# Patient Record
Sex: MALE | Race: WHITE | NOT HISPANIC OR LATINO | Employment: OTHER | URBAN - METROPOLITAN AREA
[De-identification: names, ages, dates, MRNs, and addresses within clinical notes are randomized per-mention and may not be internally consistent; named-entity substitution may affect disease eponyms.]

---

## 2017-01-04 PROBLEM — K22.2 SCHATZKI'S RING: Status: ACTIVE | Noted: 2017-01-04

## 2017-01-04 PROBLEM — G43.909 MIGRAINE HEADACHE: Status: ACTIVE | Noted: 2017-01-04

## 2017-01-04 PROBLEM — E78.5 DYSLIPIDEMIA: Status: ACTIVE | Noted: 2017-01-04

## 2017-01-04 PROBLEM — I10 HYPERTENSION: Status: ACTIVE | Noted: 2017-01-04

## 2017-01-04 PROBLEM — K21.9 GASTROESOPHAGEAL REFLUX DISEASE: Status: ACTIVE | Noted: 2017-01-04

## 2017-01-04 PROBLEM — K22.10 EROSIVE ESOPHAGITIS: Status: ACTIVE | Noted: 2017-01-04

## 2017-01-04 PROBLEM — I25.10 CORONARY ARTERY DISEASE: Status: ACTIVE | Noted: 2017-01-04

## 2017-02-22 ENCOUNTER — OFFICE VISIT (OUTPATIENT)
Dept: CARDIOLOGY | Facility: CLINIC | Age: 62
End: 2017-02-22

## 2017-02-22 VITALS
BODY MASS INDEX: 27.06 KG/M2 | DIASTOLIC BLOOD PRESSURE: 99 MMHG | HEIGHT: 75 IN | SYSTOLIC BLOOD PRESSURE: 152 MMHG | WEIGHT: 217.6 LBS | HEART RATE: 64 BPM

## 2017-02-22 DIAGNOSIS — E78.5 DYSLIPIDEMIA: ICD-10-CM

## 2017-02-22 DIAGNOSIS — I10 ESSENTIAL HYPERTENSION: ICD-10-CM

## 2017-02-22 DIAGNOSIS — I25.10 CORONARY ARTERY DISEASE INVOLVING NATIVE CORONARY ARTERY OF NATIVE HEART WITHOUT ANGINA PECTORIS: Primary | ICD-10-CM

## 2017-02-22 PROCEDURE — 99211 OFF/OP EST MAY X REQ PHY/QHP: CPT | Performed by: INTERNAL MEDICINE

## 2017-02-22 RX ORDER — LANSOPRAZOLE 30 MG/1
30 CAPSULE, DELAYED RELEASE ORAL DAILY
COMMUNITY
End: 2017-02-22 | Stop reason: SDUPTHER

## 2017-02-22 RX ORDER — ASPIRIN 325 MG
325 TABLET ORAL DAILY
COMMUNITY

## 2017-02-22 RX ORDER — NITROGLYCERIN 0.4 MG/1
0.4 TABLET SUBLINGUAL
COMMUNITY
End: 2017-02-22 | Stop reason: SDUPTHER

## 2017-02-22 RX ORDER — LANSOPRAZOLE 30 MG/1
30 CAPSULE, DELAYED RELEASE ORAL DAILY
Qty: 90 CAPSULE | Refills: 3 | Status: SHIPPED | OUTPATIENT
Start: 2017-02-22 | End: 2017-06-01 | Stop reason: SDUPTHER

## 2017-02-22 RX ORDER — RAMIPRIL 5 MG/1
5 CAPSULE ORAL DAILY
Qty: 90 CAPSULE | Refills: 3 | Status: SHIPPED | OUTPATIENT
Start: 2017-02-22 | End: 2017-03-06 | Stop reason: SDUPTHER

## 2017-02-22 RX ORDER — PROPRANOLOL HYDROCHLORIDE 160 MG/1
160 CAPSULE, EXTENDED RELEASE ORAL
Qty: 90 CAPSULE | Refills: 3 | Status: SHIPPED | OUTPATIENT
Start: 2017-02-22 | End: 2017-03-06 | Stop reason: SDUPTHER

## 2017-02-22 RX ORDER — NITROGLYCERIN 0.4 MG/1
0.4 TABLET SUBLINGUAL
Qty: 25 TABLET | Refills: 11 | Status: SHIPPED | OUTPATIENT
Start: 2017-02-22 | End: 2017-03-06 | Stop reason: SDUPTHER

## 2017-02-22 NOTE — PROGRESS NOTES
Jeremias LIZAMA Kirby  1955  [phone]      02/22/2017    Rudy Blair MD    Chief Complaint   Patient presents with   • Coronary Artery Disease     PROBLEM LIST:  1.  Coronary artery disease:  a. Inferior myocardial infarction,  status post TPA therapy on 01/21/2001 -- data deficit.   b. Multivessel disease by cardiac catheterization in June 2006.   c. Coronary artery bypass grafting x5 by Dr. Eliu Madrid, 01/15/2006:  LIMA to the LAD and diagonal, CATERINA from the LIMA  as a pedicle graft sequentially to OM1 and the PLB, SVG to the PDA.   d. Cardiac GXT revealed anterior myocardial perfusion defect, 05/01/2006, with a normal ejection fraction.   e. PTCA and stent placement to the circumflex using a 2.5 x 13 mm Cypher  drug eluting stent, 05/12/2006.   2. Previous tobacco abuse.   3.  Hypertension.   4. Dyslipidemia.   5. Migraine headaches.   6. Erosive/ulcerative esophagitis.   7. Schatzki's ring.  8. Residual gastroesophageal reflux disease with mild dysphagia.   9. Nonspecific generalized joint pain.   10. Surgical history:    a. Right knee surgery.   b. Left wrist reconstruction.   c. Inguinal hernia repair.   d. Incisional hernia  repair.   e. Esophageal dilatation.   f. Coronary artery bypass grafting.   g. Cholecystectomy, September 2008.      Allergies   Allergen Reactions   • Crestor [Rosuvastatin Calcium]      leg cramps and weakness after a year.    • Lipitor [Atorvastatin]    • Livalo [Pitavastatin]       leg cramps and weakness.    • Vytorin [Ezetimibe-Simvastatin]        Current Medications:      Current Outpatient Prescriptions:   •  Alirocumab (PRALUENT) 75 MG/ML solution pen-injector, Inject 75 mg under the skin every 14 (fourteen) days., Disp: 6 pen, Rfl: 3  •  aspirin 325 MG tablet, Take 325 mg by mouth Daily., Disp: , Rfl:   •  lansoprazole (PREVACID) 30 MG capsule, Take 30 mg by mouth Daily., Disp: , Rfl:   •  nitroglycerin (NITROSTAT) 0.4 MG SL tablet, Place 0.4 mg under the tongue  "Every 5 (Five) Minutes As Needed for chest pain. Take no more than 3 doses in 15 minutes., Disp: , Rfl:   •  propranolol LA (INDERAL LA) 160 MG 24 hr capsule, Take 1 capsule by mouth daily., Disp: 90 capsule, Rfl: 3  •  ramipril (ALTACE) 5 MG capsule, Take 1 capsule by mouth daily., Disp: 90 capsule, Rfl: 3    HPI    Mr. Orr presents today for follow up for coronary artery disease. Since last visit, patient has been feeling well from a cardiac standpoint. He notes he did have some chest pain this morning, but blames it on his ulcer disease and stress. He monitors his blood pressure at home and notes it typically runs in the normal range. Patient denies chest pain, palpitations, shortness of breath, edema, PND, orthopnea, dizziness, and syncope. 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    The following portions of the patient's history were reviewed and updated as appropriate: allergies, current medications and problem list.    Pertinent positives as listed in the HPI.  All other systems reviewed are negative.    Vitals:    02/22/17 1024   BP: 152/99   BP Location: Right arm   Patient Position: Sitting   Pulse: 64   Weight: 217 lb 9.6 oz (98.7 kg)   Height: 75\" (190.5 cm)       General: Alert and oriented  Neck: Jugular venous pressure is within normal limits. Carotids have normal upstrokes without bruits.   Cardiovascular: Heart has a nondisplaced focal PMI. Regular rate and rhythm without murmur, gallop or rub.  Lungs: Clear without rales or wheezes. Equal expansion is noted.   Extremities: Show no edema.  Skin: warm and dry.  Neurologic: nonfocal      Diagnostic Data:      Procedures    Assessment:      ICD-10-CM ICD-9-CM   1. Coronary artery disease involving native coronary artery of native heart without angina pectoris I25.10 414.01   2. Essential hypertension I10 401.9   3. Dyslipidemia E78.5 272.4       Plan:    1. Get lipid panel within the next 3 months.  2. Continue current " medications.  3. F/up in 12 months or sooner if needed.    Scribed for Magdalene Scott MD by Jay Morton. 2/22/2017  10:36 AM    I, Magdalene Scott MD, personally performed the services described in this documentation as scribed by the above named individual in my presence, and it is both accurate and complete.  2/22/2017  10:38 AM

## 2017-03-06 RX ORDER — RAMIPRIL 5 MG/1
5 CAPSULE ORAL DAILY
Qty: 90 CAPSULE | Refills: 3 | Status: SHIPPED | OUTPATIENT
Start: 2017-03-06 | End: 2018-02-28 | Stop reason: SDUPTHER

## 2017-03-06 RX ORDER — NITROGLYCERIN 0.4 MG/1
0.4 TABLET SUBLINGUAL
Qty: 25 TABLET | Refills: 11 | Status: SHIPPED | OUTPATIENT
Start: 2017-03-06 | End: 2019-10-21 | Stop reason: SDUPTHER

## 2017-03-06 RX ORDER — PROPRANOLOL HYDROCHLORIDE 160 MG/1
160 CAPSULE, EXTENDED RELEASE ORAL
Qty: 90 CAPSULE | Refills: 3 | Status: SHIPPED | OUTPATIENT
Start: 2017-03-06 | End: 2018-02-28 | Stop reason: SDUPTHER

## 2017-04-13 DIAGNOSIS — E78.00 HYPERCHOLESTEREMIA: Primary | ICD-10-CM

## 2017-06-01 RX ORDER — LANSOPRAZOLE 30 MG/1
30 CAPSULE, DELAYED RELEASE ORAL DAILY
Qty: 90 CAPSULE | Refills: 0 | Status: SHIPPED | OUTPATIENT
Start: 2017-06-01 | End: 2017-09-01 | Stop reason: SDUPTHER

## 2017-06-15 DIAGNOSIS — E78.00 HYPERCHOLESTEREMIA: ICD-10-CM

## 2017-07-06 DIAGNOSIS — E78.00 HYPERCHOLESTEREMIA: ICD-10-CM

## 2017-09-05 RX ORDER — LANSOPRAZOLE 30 MG/1
CAPSULE, DELAYED RELEASE ORAL
Qty: 90 CAPSULE | Refills: 2 | Status: SHIPPED | OUTPATIENT
Start: 2017-09-05 | End: 2018-02-28 | Stop reason: SDUPTHER

## 2018-01-19 DIAGNOSIS — E78.5 DYSLIPIDEMIA: Primary | ICD-10-CM

## 2018-02-05 ENCOUNTER — HOSPITAL ENCOUNTER (OUTPATIENT)
Age: 63
End: 2018-02-05
Payer: COMMERCIAL

## 2018-02-05 DIAGNOSIS — E78.5: Primary | ICD-10-CM

## 2018-02-05 LAB
ALBUMIN LEVEL: 3.8 GM/DL (ref 3.4–5)
ALP ISO SERPL-ACNC: 128 U/L (ref 46–116)
ALT SERPLBLD-CCNC: 38 U/L (ref 12–78)
AST SERPL QL: 25 U/L (ref 15–37)
BILIRUB DIRECT SERPL-MCNC: 0.1 MG/DL (ref 0–0.2)
BILIRUBIN,TOTAL: 0.5 MG/DL (ref 0.2–1)
CHOLEST SPEC-SCNC: 163 MG/DL (ref 140–200)
HDLC SERPL-MCNC: 46 MG/DL (ref 27–67)
PROT SERPL-MCNC: 6.7 GM/DL (ref 6.4–8.2)
TRIGLYCERIDES: 150 MG/DL (ref 30–200)

## 2018-02-05 PROCEDURE — 80061 LIPID PANEL: CPT

## 2018-02-05 PROCEDURE — 80076 HEPATIC FUNCTION PANEL: CPT

## 2018-02-05 PROCEDURE — 36415 COLL VENOUS BLD VENIPUNCTURE: CPT

## 2018-02-28 ENCOUNTER — OFFICE VISIT (OUTPATIENT)
Dept: CARDIOLOGY | Facility: CLINIC | Age: 63
End: 2018-02-28

## 2018-02-28 VITALS
BODY MASS INDEX: 28.77 KG/M2 | WEIGHT: 231.4 LBS | HEIGHT: 75 IN | OXYGEN SATURATION: 96 % | SYSTOLIC BLOOD PRESSURE: 138 MMHG | DIASTOLIC BLOOD PRESSURE: 86 MMHG | HEART RATE: 63 BPM

## 2018-02-28 DIAGNOSIS — I10 ESSENTIAL HYPERTENSION: ICD-10-CM

## 2018-02-28 DIAGNOSIS — E78.5 HYPERLIPIDEMIA LDL GOAL <70: ICD-10-CM

## 2018-02-28 DIAGNOSIS — I25.810 CORONARY ARTERY DISEASE INVOLVING CORONARY BYPASS GRAFT OF NATIVE HEART WITHOUT ANGINA PECTORIS: Primary | ICD-10-CM

## 2018-02-28 PROCEDURE — 99213 OFFICE O/P EST LOW 20 MIN: CPT | Performed by: INTERNAL MEDICINE

## 2018-02-28 RX ORDER — CETIRIZINE HYDROCHLORIDE 10 MG/1
10 TABLET ORAL DAILY
COMMUNITY

## 2018-02-28 RX ORDER — RAMIPRIL 5 MG/1
5 CAPSULE ORAL DAILY
Qty: 90 CAPSULE | Refills: 3 | Status: SHIPPED | OUTPATIENT
Start: 2018-02-28 | End: 2019-03-11 | Stop reason: SDUPTHER

## 2018-02-28 RX ORDER — PROPRANOLOL HYDROCHLORIDE 160 MG/1
160 CAPSULE, EXTENDED RELEASE ORAL
Qty: 90 CAPSULE | Refills: 3 | Status: SHIPPED | OUTPATIENT
Start: 2018-02-28 | End: 2019-03-11 | Stop reason: SDUPTHER

## 2018-02-28 RX ORDER — LANSOPRAZOLE 30 MG/1
30 CAPSULE, DELAYED RELEASE ORAL DAILY
Qty: 90 CAPSULE | Refills: 3 | Status: SHIPPED | OUTPATIENT
Start: 2018-02-28 | End: 2019-02-25 | Stop reason: SDUPTHER

## 2018-02-28 RX ORDER — NAPROXEN SODIUM 220 MG
220 TABLET ORAL DAILY
COMMUNITY

## 2018-02-28 NOTE — PROGRESS NOTES
Jeremias LIZAMA Kirby  1955  [phone]      02/28/2018    Ted Jackson MD    Chief Complaint   Patient presents with   • Coronary Artery Disease   • Hypertension   • Hyperlipidemia       Problem List:  1.  Coronary artery disease:  a. Inferior myocardial infarction,  status post TPA therapy on 01/21/2001 -- data deficit.   b. Multivessel disease by cardiac catheterization in June 2006.   c. Coronary artery bypass grafting x5 by Dr. Eliu Madrid, 01/15/2006:  LIMA to the LAD and diagonal, CATERINA from the LIMA  as a pedicle graft sequentially to OM1 and the PLB, SVG to the PDA.   d. Cardiac GXT revealed anterior myocardial perfusion defect, 05/01/2006, with a normal ejection fraction.   e. PTCA and stent placement to the circumflex using a 2.5 x 13 mm Cypher  drug eluting stent, 05/12/2006.   2. Previous tobacco abuse.   3. Hypertension.   4. Dyslipidemia.   5. Migraine headaches.   6. Erosive/ulcerative esophagitis.   7. Schatzki's ring.  8. Residual gastroesophageal reflux disease with mild dysphagia.   9. Nonspecific generalized joint pain.   10. Surgical history:    a. Right knee surgery.   b. Left wrist reconstruction.   c. Inguinal hernia repair.   d. Incisional hernia  repair.   e. Esophageal dilatation.   f. Coronary artery bypass grafting.   g. Cholecystectomy, September 2008.        Allergies   Allergen Reactions   • Crestor [Rosuvastatin Calcium]      leg cramps and weakness after a year.    • Lipitor [Atorvastatin]    • Livalo [Pitavastatin]       leg cramps and weakness.    • Vytorin [Ezetimibe-Simvastatin]        Current Medications:      Current Outpatient Prescriptions:   •  Alirocumab (PRALUENT) 75 MG/ML solution pen-injector, Inject 75 mg under the skin Every 14 (Fourteen) Days., Disp: 2 pen, Rfl: 11  •  aspirin 325 MG tablet, Take 325 mg by mouth Daily., Disp: , Rfl:   •  cetirizine (zyrTEC) 10 MG tablet, Take 10 mg by mouth Daily., Disp: , Rfl:   •  FIBER PO, Take  by mouth Daily., Disp: ,  "Rfl:   •  lansoprazole (PREVACID) 30 MG capsule, TAKE 1 CAPSULE BY MOUTH  EVERY DAY FOR STOMACH Rfsrem none, Disp: 90 capsule, Rfl: 2  •  naproxen sodium (ALEVE) 220 MG tablet, Take 220 mg by mouth Every Other Day., Disp: , Rfl:   •  nitroglycerin (NITROSTAT) 0.4 MG SL tablet, Place 1 tablet under the tongue Every 5 (Five) Minutes As Needed for chest pain. Take no more than 3 doses in 15 minutes., Disp: 25 tablet, Rfl: 11  •  propranolol LA (INDERAL LA) 160 MG 24 hr capsule, Take 1 capsule by mouth Daily., Disp: 90 capsule, Rfl: 3  •  ramipril (ALTACE) 5 MG capsule, Take 1 capsule by mouth Daily., Disp: 90 capsule, Rfl: 3    HPI    Jeremias Orr presents today for 12 month follow up of coronary artery disease, hypertension, and hyperlipidemia. Since last visit, patient has been feeling well overall from a cardiovascular standpoint. He admittedly does not monitor his blood pressure at home. He has been experiencing migraines again as of late. He was given tramadol for this, but has not taken it. Patient denies chest pain, palpitations, shortness of breath, edema, PND, orthopnea, dizziness, and syncope. 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 He saw Dr. Ibanez with orthopedics about his knee.    The following portions of the patient's history were reviewed and updated as appropriate: allergies, current medications and problem list.    Pertinent positives as listed in the HPI.  All other systems reviewed are negative.    Vitals:    02/28/18 1044   BP: 138/86   BP Location: Left arm   Patient Position: Sitting   Pulse: 63   SpO2: 96%   Weight: 105 kg (231 lb 6.4 oz)   Height: 190.5 cm (75\")       Physical Exam:  General: Alert and oriented to person, place, and time.  Neck: Jugular venous pressure is within normal limits. Carotids have normal upstrokes without bruits.   Cardiovascular: Regular rate and rhythm without murmur gallop or rub.  Lungs: Clear without rales or " wheezes. Equal expansion is noted.   Extremities: Show trace edema.  Skin: warm and dry.  Neurologic: nonfocal    Diagnostic Data:    Lipid panel (2/5/2018):  , Trig 150, HDL 46, LDL 87    Procedures    Assessment:      ICD-10-CM ICD-9-CM   1. Coronary artery disease involving coronary bypass graft of native heart without angina pectoris I25.810 414.05   2. Essential hypertension I10 401.9   3. Hyperlipidemia LDL goal <70 E78.5 272.4       Plan:    1. Continue current medications.  2. F/up in 12 months or sooner if needed.    Scribed for Magdalene Scott MD by Jay Morton. 2/28/2018  11:05 AM    I Magdalene Scott MD personally performed the services described in this documentation as scribed by the above individual in my presence, and it is both accurate and complete.    Magdalene Scott MD, FACC

## 2018-05-23 ENCOUNTER — HOSPITAL ENCOUNTER (OUTPATIENT)
Age: 63
End: 2018-05-23
Payer: COMMERCIAL

## 2018-05-23 DIAGNOSIS — M25.561: Primary | ICD-10-CM

## 2018-05-23 PROCEDURE — 73564 X-RAY EXAM KNEE 4 OR MORE: CPT

## 2018-11-19 ENCOUNTER — DOCUMENTATION (OUTPATIENT)
Dept: CARDIOLOGY | Facility: CLINIC | Age: 63
End: 2018-11-19

## 2018-11-19 NOTE — PROGRESS NOTES
Insurance will not be covering Praluent after 1/1/19- we will switch to Repatha 140 mg at that time- PT's wife will call me after 1/1/19 and we will send a script to [facility]

## 2019-02-25 RX ORDER — LANSOPRAZOLE 30 MG/1
30 CAPSULE, DELAYED RELEASE ORAL DAILY
Qty: 90 CAPSULE | Refills: 3 | Status: SHIPPED | OUTPATIENT
Start: 2019-02-25 | End: 2020-03-02

## 2019-02-28 DIAGNOSIS — E78.5 HYPERLIPIDEMIA, UNSPECIFIED HYPERLIPIDEMIA TYPE: Primary | ICD-10-CM

## 2019-03-04 ENCOUNTER — HOSPITAL ENCOUNTER (OUTPATIENT)
Age: 64
End: 2019-03-04
Payer: COMMERCIAL

## 2019-03-04 DIAGNOSIS — E78.5: Primary | ICD-10-CM

## 2019-03-04 LAB
ALBUMIN LEVEL: 3.6 GM/DL (ref 3.4–5)
ALBUMIN/GLOB SERPL: 1.2 {RATIO} (ref 1.1–1.8)
ALP ISO SERPL-ACNC: 115 U/L (ref 46–116)
ALT SERPLBLD-CCNC: 37 U/L (ref 12–78)
ANION GAP SERPL CALC-SCNC: 11.3 MEQ/L (ref 5–15)
AST SERPL QL: 20 U/L (ref 15–37)
BILIRUBIN,TOTAL: 0.5 MG/DL (ref 0.2–1)
BUN SERPL-MCNC: 18 MG/DL (ref 7–18)
CALCIUM SPEC-MCNC: 8.9 MG/DL (ref 8.5–10.1)
CHLORIDE SPEC-SCNC: 106 MMOL/L (ref 98–107)
CHOLEST SPEC-SCNC: 173 MG/DL (ref 140–200)
CO2 SERPL-SCNC: 29 MMOL/L (ref 21–32)
CREAT BLD-SCNC: 1.2 MG/DL (ref 0.7–1.3)
ESTIMATED GLOMERULAR FILT RATE: 61 ML/MIN (ref 60–?)
GFR (AFRICAN AMERICAN): 74 ML/MIN (ref 60–?)
GLOBULIN SER CALC-MCNC: 2.9 GM/DL (ref 1.3–3.2)
GLUCOSE: 96 MG/DL (ref 74–106)
HDLC SERPL-MCNC: 40 MG/DL (ref 27–67)
POTASSIUM: 5.3 MMOL/L (ref 3.5–5.1)
PROT SERPL-MCNC: 6.5 GM/DL (ref 6.4–8.2)
SODIUM SPEC-SCNC: 141 MMOL/L (ref 136–145)
TRIGLYCERIDES: 145 MG/DL (ref 30–200)

## 2019-03-04 PROCEDURE — 80053 COMPREHEN METABOLIC PANEL: CPT

## 2019-03-04 PROCEDURE — 80061 LIPID PANEL: CPT

## 2019-03-04 PROCEDURE — 36415 COLL VENOUS BLD VENIPUNCTURE: CPT

## 2019-03-06 ENCOUNTER — OFFICE VISIT (OUTPATIENT)
Dept: CARDIOLOGY | Facility: CLINIC | Age: 64
End: 2019-03-06

## 2019-03-06 VITALS
SYSTOLIC BLOOD PRESSURE: 142 MMHG | DIASTOLIC BLOOD PRESSURE: 82 MMHG | WEIGHT: 234 LBS | HEIGHT: 75 IN | HEART RATE: 64 BPM | BODY MASS INDEX: 29.09 KG/M2

## 2019-03-06 DIAGNOSIS — I10 ESSENTIAL HYPERTENSION: ICD-10-CM

## 2019-03-06 DIAGNOSIS — I25.810 CORONARY ARTERY DISEASE INVOLVING CORONARY BYPASS GRAFT OF NATIVE HEART WITHOUT ANGINA PECTORIS: Primary | ICD-10-CM

## 2019-03-06 DIAGNOSIS — E78.5 HYPERLIPIDEMIA LDL GOAL <70: ICD-10-CM

## 2019-03-06 PROCEDURE — 99213 OFFICE O/P EST LOW 20 MIN: CPT | Performed by: NURSE PRACTITIONER

## 2019-03-06 NOTE — PROGRESS NOTES
Jeremias ALDA Kirby  1955  63 y.o.  [phone]  Mobile phone not available      03/06/2019    PCP: Ted Jackson MD    Chief Complaint   Patient presents with   • Coronary Artery Disease       Problem List:  1.  Coronary artery disease:  a. Inferior MI,  status post TPA therapy on 01/21/2001 -- data deficit.   b. Multivessel disease by cardiac catheterization in June 2006.   c. CABG x5 by Dr. Eliu Madrid, 01/15/2006:  LIMA to the LAD and diagonal, CATERINA from the LIMA  as a pedicle graft sequentially to OM1 and the PLB, SVG to the PDA.   d. Cardiac GXT revealed anterior myocardial perfusion defect, 05/01/2006, with a normal ejection fraction.   e. PTCA and stent placement to the circumflex using a 2.5 x 13 mm Cypher  drug eluting stent, 05/12/2006.   2. Previous tobacco abuse.   3. Hypertension.   4. Dyslipidemia.   5. Migraine headaches.   6. Erosive/ulcerative esophagitis.   7. Schatzki's ring.  8. Residual gastroesophageal reflux disease with mild dysphagia.   9. Nonspecific generalized joint pain.   10. Surgical history:    a. Right knee surgery.   b. Left wrist reconstruction.   c. Inguinal hernia repair.   d. Incisional hernia  repair.   e. Esophageal dilatation.   f. Coronary artery bypass grafting.   g. Cholecystectomy, September 2008.      Allergies   Allergen Reactions   • Crestor [Rosuvastatin Calcium]      leg cramps and weakness after a year.    • Lipitor [Atorvastatin]    • Livalo [Pitavastatin]       leg cramps and weakness.    • Vytorin [Ezetimibe-Simvastatin]        Current Medications:      Current Outpatient Medications:   •  aspirin 325 MG tablet, Take 325 mg by mouth Daily., Disp: , Rfl:   •  cetirizine (zyrTEC) 10 MG tablet, Take 10 mg by mouth Daily., Disp: , Rfl:   •  FIBER PO, Take  by mouth Daily., Disp: , Rfl:   •  lansoprazole (PREVACID) 30 MG capsule, TAKE 1 CAPSULE BY MOUTH DAILY, Disp: 90 capsule, Rfl: 3  •  naproxen sodium (ALEVE) 220 MG tablet, Take 220 mg by mouth Every Other  "Day., Disp: , Rfl:   •  nitroglycerin (NITROSTAT) 0.4 MG SL tablet, Place 1 tablet under the tongue Every 5 (Five) Minutes As Needed for chest pain. Take no more than 3 doses in 15 minutes., Disp: 25 tablet, Rfl: 11  •  PRALUENT 75 MG/ML solution pen-injector, INJECT 75 MG SUBCUTANEOUSLY EVERY 14 DAYS., Disp: 6 pen, Rfl: 1  •  propranolol LA (INDERAL LA) 160 MG 24 hr capsule, Take 1 capsule by mouth Daily., Disp: 90 capsule, Rfl: 3  •  ramipril (ALTACE) 5 MG capsule, Take 1 capsule by mouth Daily., Disp: 90 capsule, Rfl: 3    HPI    Jeremias Orr is a 63 y.o. male who presents today for annual  follow up of coronary artery disease, hypertension and hyperlipidemia. Since last visit, patient has been doing well from a cardiac standpoint.  He states that based on weather throughout the winter he has not been able to be as physically active as he usually is.  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  He does admit to looking forward to turning 64 this year.  His Praluent is currently being authorized.  His most recent LDL from March 2019 was 104 on Praluent.  He states that his blood pressures tend to run in the upper 130s consistently.  He will continue to monitor from home and let us know if they start running higher trending upward.  He has no complaints of chest pain, shortness of breath, dyspnea on exertion, edema, fatigue, palpitations, dizziness and syncope.  Overall, he seems to be doing very well from a cardiac standpoint.     The following portions of the patient's history were reviewed and updated as appropriate: allergies, current medications and problem list.    Pertinent positives as listed in the HPI.  All other systems reviewed are negative.    Vitals:    03/06/19 1033   BP: 142/82   BP Location: Right arm   Patient Position: Sitting   Pulse: 64   Weight: 106 kg (234 lb)   Height: 190.5 cm (75\")       Physical Exam:    GENERAL: well-developed, well-nourished; in no acute " distress.   NECK:  Carotid upstrokes are 2+ and  symmetrical without bruits.   LUNGS: Clear to auscultation bilaterally without wheezing, rhonchi, or rales noted.   CARDIOVASCULAR: The heart has a regular rate with a normal S1 and S2. There is no murmur, gallop, rub, or click appreciated. The PMI is nondisplaced.   NEUROLOGICAL: Nonfocal; Alert and oriented  PERIPHERAL VASCULAR:  Posterior tibial pulses are 2+ and symmetrical. There is no peripheral edema.   SKIN:  Warm and dry  PSYCHIATRIC: normal mood and affect; behavior appropriate    Diagnostic Data:  Lipids 3/4/19  Total cholesterol 173  Triglycerides 145  HDL 40      Procedures    Assessment:      ICD-10-CM ICD-9-CM   1. Coronary artery disease involving coronary bypass graft of native heart without angina pectoris I25.810 414.05   2. Essential hypertension I10 401.9   3. Hyperlipidemia LDL goal <70 E78.5 272.4       Plan:    1. Encouraged routine exercise and dietary modifications  2. Monitor blood pressures at home.  If they should start remaining greater than 140 systolic, he will notify our office.  3. Continue all  current medications.  4. F/up in 12 months or sooner if needed.    Seen independently by CARO Garcia on . 3/6/2019  11:05 AM

## 2019-03-11 RX ORDER — RAMIPRIL 5 MG/1
5 CAPSULE ORAL DAILY
Qty: 90 CAPSULE | Refills: 3 | Status: SHIPPED | OUTPATIENT
Start: 2019-03-11 | End: 2020-03-16

## 2019-03-11 RX ORDER — PROPRANOLOL HYDROCHLORIDE 160 MG/1
160 CAPSULE, EXTENDED RELEASE ORAL
Qty: 90 CAPSULE | Refills: 3 | Status: SHIPPED | OUTPATIENT
Start: 2019-03-11 | End: 2020-03-16

## 2019-10-21 RX ORDER — NITROGLYCERIN 0.4 MG/1
0.4 TABLET SUBLINGUAL
Qty: 25 TABLET | Refills: 11 | Status: SHIPPED | OUTPATIENT
Start: 2019-10-21

## 2020-03-02 RX ORDER — LANSOPRAZOLE 30 MG/1
30 CAPSULE, DELAYED RELEASE ORAL DAILY
Qty: 90 CAPSULE | Refills: 3 | Status: SHIPPED | OUTPATIENT
Start: 2020-03-02 | End: 2020-04-30 | Stop reason: SDUPTHER

## 2020-03-09 ENCOUNTER — TELEPHONE (OUTPATIENT)
Dept: CARDIOLOGY | Facility: CLINIC | Age: 65
End: 2020-03-09

## 2020-03-09 DIAGNOSIS — E78.5 HYPERLIPIDEMIA LDL GOAL <70: Primary | ICD-10-CM

## 2020-03-09 NOTE — TELEPHONE ENCOUNTER
"Pt called and stated that he needed lab orders sent to [facility] in [place]. Pt states he has already had the labs drawn, but needs to orders. Pt states he needs orders for \"the labs he usually gets\".     Orders for CMP and FLP faxed to [facility] at [phone].  "

## 2020-03-16 RX ORDER — RAMIPRIL 5 MG/1
5 CAPSULE ORAL DAILY
Qty: 90 CAPSULE | Refills: 3 | Status: SHIPPED | OUTPATIENT
Start: 2020-03-16 | End: 2020-04-30 | Stop reason: SDUPTHER

## 2020-03-16 RX ORDER — PROPRANOLOL HYDROCHLORIDE 160 MG/1
160 CAPSULE, EXTENDED RELEASE ORAL
Qty: 90 EACH | Refills: 2 | Status: SHIPPED | OUTPATIENT
Start: 2020-03-16 | End: 2020-04-30 | Stop reason: SDUPTHER

## 2020-04-30 RX ORDER — LANSOPRAZOLE 30 MG/1
30 CAPSULE, DELAYED RELEASE ORAL DAILY
Qty: 90 CAPSULE | Refills: 3 | Status: SHIPPED | OUTPATIENT
Start: 2020-04-30 | End: 2021-02-15

## 2020-04-30 RX ORDER — RAMIPRIL 5 MG/1
5 CAPSULE ORAL DAILY
Qty: 90 CAPSULE | Refills: 3 | Status: SHIPPED | OUTPATIENT
Start: 2020-04-30 | End: 2021-02-15

## 2020-04-30 RX ORDER — PROPRANOLOL HYDROCHLORIDE 160 MG/1
160 CAPSULE, EXTENDED RELEASE ORAL
Qty: 90 EACH | Refills: 2 | Status: SHIPPED | OUTPATIENT
Start: 2020-04-30 | End: 2021-02-15

## 2020-05-27 ENCOUNTER — HOSPITAL ENCOUNTER (EMERGENCY)
Age: 65
Discharge: HOME | End: 2020-05-27
Payer: MEDICARE

## 2020-05-27 VITALS
SYSTOLIC BLOOD PRESSURE: 134 MMHG | DIASTOLIC BLOOD PRESSURE: 88 MMHG | OXYGEN SATURATION: 96 % | HEART RATE: 70 BPM | RESPIRATION RATE: 18 BRPM | TEMPERATURE: 97.88 F

## 2020-05-27 VITALS
TEMPERATURE: 97.88 F | RESPIRATION RATE: 18 BRPM | SYSTOLIC BLOOD PRESSURE: 142 MMHG | DIASTOLIC BLOOD PRESSURE: 81 MMHG | OXYGEN SATURATION: 97 % | HEART RATE: 69 BPM

## 2020-05-27 VITALS — BODY MASS INDEX: 28.1 KG/M2

## 2020-05-27 DIAGNOSIS — R42: Primary | ICD-10-CM

## 2020-05-27 DIAGNOSIS — Z95.5: ICD-10-CM

## 2020-05-27 DIAGNOSIS — R29.898: ICD-10-CM

## 2020-05-27 DIAGNOSIS — Z95.1: ICD-10-CM

## 2020-05-27 DIAGNOSIS — I25.10: ICD-10-CM

## 2020-05-27 LAB
ANION GAP SERPL CALC-SCNC: 12.7 MEQ/L (ref 5–15)
BUN SERPL-MCNC: 21 MG/DL (ref 9–20)
CALCIUM SPEC-MCNC: 9.3 MG/DL (ref 8.4–10.2)
CHLORIDE SPEC-SCNC: 103 MMOL/L (ref 98–107)
CO2 SERPL-SCNC: 29 MMOL/L (ref 22–30)
CREAT BLD-SCNC: 1.1 MG/DL (ref 0.66–1.25)
CREATININE CLEARANCE ESTIMATED: 95 ML/MIN (ref 50–200)
ESTIMATED GLOMERULAR FILT RATE: 67 ML/MIN (ref 60–?)
GFR (AFRICAN AMERICAN): 81 ML/MIN (ref 60–?)
GLUCOSE: 101 MG/DL (ref 74–100)
HCT VFR BLD CALC: 43.7 % (ref 42–52)
HGB BLD-MCNC: 15 G/DL (ref 14.1–18)
MCHC RBC-ENTMCNC: 34.4 G/DL (ref 31.8–35.4)
MCV RBC: 92.9 FL (ref 80–94)
MEAN CORPUSCULAR HEMOGLOBIN: 31.9 PG (ref 27–31.2)
PLATELET # BLD: 217 K/MM3 (ref 142–424)
POTASSIUM: 4.7 MMOL/L (ref 3.5–5.1)
RBC # BLD AUTO: 4.7 M/MM3 (ref 4.6–6.2)
SODIUM SPEC-SCNC: 140 MMOL/L (ref 136–145)
TROPONIN I: < 0.01 NG/ML (ref 0–0.03)
WBC # BLD AUTO: 4.3 K/MM3 (ref 4.8–10.8)

## 2020-05-27 PROCEDURE — 80048 BASIC METABOLIC PNL TOTAL CA: CPT

## 2020-05-27 PROCEDURE — 85025 COMPLETE CBC W/AUTO DIFF WBC: CPT

## 2020-05-27 PROCEDURE — 71045 X-RAY EXAM CHEST 1 VIEW: CPT

## 2020-05-27 PROCEDURE — 93005 ELECTROCARDIOGRAM TRACING: CPT

## 2020-05-27 PROCEDURE — 84484 ASSAY OF TROPONIN QUANT: CPT

## 2020-05-27 PROCEDURE — 99283 EMERGENCY DEPT VISIT LOW MDM: CPT

## 2020-05-27 PROCEDURE — 71046 X-RAY EXAM CHEST 2 VIEWS: CPT

## 2020-05-27 NOTE — PC.NURSE
Patient requesting to cancel Covid testing stating that he does not have symptoms and would rather just get an inhaler. MD notified. Ok to cancel Covid testing.

## 2020-06-10 ENCOUNTER — OFFICE VISIT (OUTPATIENT)
Dept: CARDIOLOGY | Facility: CLINIC | Age: 65
End: 2020-06-10

## 2020-06-10 VITALS
DIASTOLIC BLOOD PRESSURE: 74 MMHG | HEART RATE: 68 BPM | WEIGHT: 233 LBS | SYSTOLIC BLOOD PRESSURE: 122 MMHG | BODY MASS INDEX: 28.97 KG/M2 | HEIGHT: 75 IN

## 2020-06-10 DIAGNOSIS — I10 ESSENTIAL HYPERTENSION: ICD-10-CM

## 2020-06-10 DIAGNOSIS — I25.810 CORONARY ARTERY DISEASE INVOLVING CORONARY BYPASS GRAFT OF NATIVE HEART WITHOUT ANGINA PECTORIS: Primary | ICD-10-CM

## 2020-06-10 DIAGNOSIS — E78.5 HYPERLIPIDEMIA LDL GOAL <70: ICD-10-CM

## 2020-06-10 PROCEDURE — 99214 OFFICE O/P EST MOD 30 MIN: CPT | Performed by: INTERNAL MEDICINE

## 2020-06-10 NOTE — PROGRESS NOTES
[facility]    Patient ID: Jeremias Orr is a 65 y.o. male.  : 1955   Contact: [phone]    Encounter date: 06/10/2020    PCP: Ted Jackson MD      Chief complaint:   Chief Complaint   Patient presents with   • Coronary Artery Disease       Problem List:  1. Coronary artery disease:  a. Inferior MI, s/p TPA therapy on 2001 -- data deficit.   b. Multivessel disease by cardiac catheterization in 2006.   c. CABG x5, 01/15/2006, Dr. Poli Madrid: LIMA to the LAD and diagonal, CATERINA from the LIMA as a pedicle graft sequentially to OM1 and the PLB, SVG to the PDA.   d. Cardiac GXT, 2006: Anterior myocardial perfusion defect. Normal EF.  e. LH, 2006: S/p PTCA and stent placement to the LCx using a 2.5 x 13 mm Cypher GABINO.   2. Previous tobacco abuse, cessation 20+ years ago.  3. Hypertension.   4. Dyslipidemia.   5. Migraine headaches.   6. Erosive/ulcerative esophagitis.   7. Schatzki's ring.  8. Residual gastroesophageal reflux disease with mild dysphagia.   9. Nonspecific generalized joint pain.   10. Surgical history:    a. Right knee surgery.   b. Left wrist reconstruction.   c. Inguinal hernia repair.   d. Incisional hernia  repair.   e. Esophageal dilatation.   f. Coronary artery bypass grafting.   g. Cholecystectomy, 2008.    Allergies   Allergen Reactions   • Crestor [Rosuvastatin Calcium]      leg cramps and weakness after a year.    • Lipitor [Atorvastatin]    • Livalo [Pitavastatin]       leg cramps and weakness.    • Vytorin [Ezetimibe-Simvastatin]        Current Medications:    Current Outpatient Medications:   •  Alirocumab 75 MG/ML solution auto-injector, Inject 75 mg under the skin into the appropriate area as directed Every 14 (Fourteen) Days., Disp: 6 pen, Rfl: 1  •  aspirin 325 MG tablet, Take 325 mg by mouth Daily., Disp: , Rfl:   •  cetirizine (zyrTEC) 10 MG tablet, Take 10 mg by mouth Daily., Disp: , Rfl:   •   "FIBER PO, Take  by mouth Daily., Disp: , Rfl:   •  lansoprazole (PREVACID) 30 MG capsule, Take 1 capsule by mouth Daily., Disp: 90 capsule, Rfl: 3  •  naproxen sodium (ALEVE) 220 MG tablet, Take 220 mg by mouth Every Other Day., Disp: , Rfl:   •  nitroglycerin (NITROSTAT) 0.4 MG SL tablet, Place 1 tablet under the tongue Every 5 (Five) Minutes As Needed for Chest Pain. Take no more than 3 doses in 15 minutes., Disp: 25 tablet, Rfl: 11  •  propranolol LA (INDERAL LA) 160 MG 24 hr capsule, Take 1 capsule by mouth Daily., Disp: 90 each, Rfl: 2  •  ramipril (ALTACE) 5 MG capsule, Take 1 capsule by mouth Daily., Disp: 90 capsule, Rfl: 3    HPI    Jeremias Orr is a 65 y.o. male who presents today for a follow up of CAD and cardiac risk factors. He was recently seen at [facility] for weak spells and dizziness. Pt believes this is due to inner-ear issues as he has been feeling ear pressure since this morning. He has been taking Mucinex BID. Pt describes weakness in his lower extremities below the knees. He initially thought this was due to low oxygen, but his O2 Saturations have been normal.  He had a similar episode like this 7 years ago.  Patient has otherwise been feeling well overall from a cardiovascular standpoint and denies chest pain, shortness of breath, palpitations, edema, dizziness, and syncope. He remains non smoker.      The following portions of the patient's history were reviewed and updated as appropriate: allergies, current medications and problem list.    Pertinent positives as listed in the HPI.  All other systems reviewed are negative.         Vitals:    06/10/20 1106   BP: 122/74   BP Location: Left arm   Patient Position: Sitting   Pulse: 68   Weight: 106 kg (233 lb)   Height: 190.5 cm (75\")       Physical Exam:  General: Alert and oriented.  Neck: Jugular venous pressure is within normal limits. Carotids have normal upstrokes without bruits.   Cardiovascular: Heart has a nondisplaced focal " PMI. Regular rate and rhythm. No murmur, gallop or rub.  Lungs: Clear, no rales or wheezes. Equal expansion is noted.   Extremities: Show no edema.  Skin: Warm and dry.  Neurologic: Nonfocal.     Diagnostic Data (reviewed with patient):    Lab date: 03/06/2020  • FLP: , , HDL 48, LDL 67.4  • CMP: All normal      Procedures      Assessment:    ICD-10-CM ICD-9-CM   1. Coronary artery disease involving coronary bypass graft of native heart without angina pectoris I25.810 414.05   2. Essential hypertension I10 401.9   3. Hyperlipidemia LDL goal <70 E78.5 272.4         Plan:  1. Continue aspirin for CAD. Nitroglycerin as needed for chest pain.  2. Continue Praluent for hyperlipidemia.  3. Continue ramipril for hypertension.  4. Continue all other current medications.  5. F/up in 12 months, sooner if needed.      Scribed for Magdalene Scott MD by Lydia Banerjee. 6/10/2020  11:18     I Magdalene Scott MD personally performed the services described in this documentation as scribed by the above individual in my presence, and it is both accurate and complete.    Magdalene Scott MD, FACC

## 2020-06-26 ENCOUNTER — HOSPITAL ENCOUNTER (OUTPATIENT)
Age: 65
End: 2020-06-26
Payer: MEDICARE

## 2020-06-26 DIAGNOSIS — R42: Primary | ICD-10-CM

## 2020-06-26 DIAGNOSIS — R55: ICD-10-CM

## 2020-06-26 PROCEDURE — 70553 MRI BRAIN STEM W/O & W/DYE: CPT

## 2020-06-26 PROCEDURE — A9576 INJ PROHANCE MULTIPACK: HCPCS

## 2020-07-10 ENCOUNTER — HOSPITAL ENCOUNTER (OUTPATIENT)
Age: 65
End: 2020-07-10
Payer: MEDICARE

## 2020-07-10 DIAGNOSIS — R42: Primary | ICD-10-CM

## 2020-07-10 PROCEDURE — 93880 EXTRACRANIAL BILAT STUDY: CPT

## 2020-08-20 RX ORDER — ALIROCUMAB 75 MG/ML
INJECTION, SOLUTION SUBCUTANEOUS
Qty: 6 PEN | Refills: 0 | Status: SHIPPED | OUTPATIENT
Start: 2020-08-20 | End: 2021-06-04 | Stop reason: SDUPTHER

## 2021-01-03 ENCOUNTER — HOSPITAL ENCOUNTER (OUTPATIENT)
Dept: HOSPITAL 22 - ER | Age: 66
Discharge: HOME | End: 2021-01-03
Payer: MEDICARE

## 2021-01-03 VITALS
SYSTOLIC BLOOD PRESSURE: 152 MMHG | DIASTOLIC BLOOD PRESSURE: 95 MMHG | HEART RATE: 69 BPM | OXYGEN SATURATION: 95 % | RESPIRATION RATE: 20 BRPM

## 2021-01-03 VITALS
RESPIRATION RATE: 16 BRPM | OXYGEN SATURATION: 98 % | SYSTOLIC BLOOD PRESSURE: 155 MMHG | DIASTOLIC BLOOD PRESSURE: 110 MMHG | TEMPERATURE: 98 F | HEART RATE: 82 BPM | BODY MASS INDEX: 26.7 KG/M2

## 2021-01-03 VITALS
SYSTOLIC BLOOD PRESSURE: 138 MMHG | OXYGEN SATURATION: 98 % | RESPIRATION RATE: 16 BRPM | HEART RATE: 75 BPM | DIASTOLIC BLOOD PRESSURE: 84 MMHG

## 2021-01-03 VITALS
HEART RATE: 80 BPM | DIASTOLIC BLOOD PRESSURE: 50 MMHG | RESPIRATION RATE: 16 BRPM | OXYGEN SATURATION: 96 % | SYSTOLIC BLOOD PRESSURE: 98 MMHG

## 2021-01-03 VITALS — SYSTOLIC BLOOD PRESSURE: 179 MMHG | OXYGEN SATURATION: 97 % | DIASTOLIC BLOOD PRESSURE: 87 MMHG | HEART RATE: 80 BPM

## 2021-01-03 VITALS
HEART RATE: 84 BPM | OXYGEN SATURATION: 97 % | RESPIRATION RATE: 18 BRPM | SYSTOLIC BLOOD PRESSURE: 145 MMHG | DIASTOLIC BLOOD PRESSURE: 95 MMHG

## 2021-01-03 VITALS
OXYGEN SATURATION: 96 % | SYSTOLIC BLOOD PRESSURE: 126 MMHG | RESPIRATION RATE: 16 BRPM | HEART RATE: 93 BPM | DIASTOLIC BLOOD PRESSURE: 87 MMHG

## 2021-01-03 VITALS
HEART RATE: 75 BPM | RESPIRATION RATE: 16 BRPM | TEMPERATURE: 97.88 F | DIASTOLIC BLOOD PRESSURE: 74 MMHG | OXYGEN SATURATION: 98 % | SYSTOLIC BLOOD PRESSURE: 138 MMHG

## 2021-01-03 VITALS — HEART RATE: 70 BPM | OXYGEN SATURATION: 94 % | DIASTOLIC BLOOD PRESSURE: 98 MMHG | SYSTOLIC BLOOD PRESSURE: 157 MMHG

## 2021-01-03 VITALS — SYSTOLIC BLOOD PRESSURE: 154 MMHG | DIASTOLIC BLOOD PRESSURE: 100 MMHG | OXYGEN SATURATION: 96 % | HEART RATE: 71 BPM

## 2021-01-03 VITALS
RESPIRATION RATE: 16 BRPM | HEART RATE: 78 BPM | DIASTOLIC BLOOD PRESSURE: 52 MMHG | SYSTOLIC BLOOD PRESSURE: 105 MMHG | OXYGEN SATURATION: 94 %

## 2021-01-03 DIAGNOSIS — T18.128A: Primary | ICD-10-CM

## 2021-01-03 DIAGNOSIS — K44.0: ICD-10-CM

## 2021-01-03 DIAGNOSIS — I48.91: ICD-10-CM

## 2021-01-03 DIAGNOSIS — Z79.899: ICD-10-CM

## 2021-01-03 DIAGNOSIS — Z79.82: ICD-10-CM

## 2021-01-03 DIAGNOSIS — I10: ICD-10-CM

## 2021-01-03 DIAGNOSIS — Z95.1: ICD-10-CM

## 2021-01-03 LAB
ANION GAP SERPL CALC-SCNC: 11.4 MEQ/L (ref 5–15)
APTT PPP: 23.6 SECONDS (ref 23.6–34)
BUN SERPL-MCNC: 20 MG/DL (ref 9–20)
CALCIUM SPEC-MCNC: 9.5 MG/DL (ref 8.4–10.2)
CHLORIDE SPEC-SCNC: 101 MMOL/L (ref 98–107)
CO2 SERPL-SCNC: 29 MMOL/L (ref 22–30)
CREAT BLD-SCNC: 1.2 MG/DL (ref 0.66–1.25)
CREATININE CLEARANCE ESTIMATED: 85 ML/MIN (ref 50–200)
ESTIMATED GLOMERULAR FILT RATE: 61 ML/MIN (ref 60–?)
GFR (AFRICAN AMERICAN): 73 ML/MIN (ref 60–?)
GLUCOSE: 106 MG/DL (ref 74–100)
HCT VFR BLD CALC: 44.3 % (ref 42–52)
HGB BLD-MCNC: 15.9 G/DL (ref 14.1–18)
INR PPP: 1.03 (ref 0.9–1.1)
MCHC RBC-ENTMCNC: 35.9 G/DL (ref 31.8–35.4)
MCV RBC: 91.1 FL (ref 80–94)
MEAN CORPUSCULAR HEMOGLOBIN: 32.7 PG (ref 27–31.2)
PLATELET # BLD: 213 K/MM3 (ref 142–424)
POTASSIUM: 4.4 MMOL/L (ref 3.5–5.1)
PT BLD: 11.4 SECONDS (ref 9.4–11.8)
RBC # BLD AUTO: 4.86 M/MM3 (ref 4.6–6.2)
SODIUM SPEC-SCNC: 137 MMOL/L (ref 136–145)
WBC # BLD AUTO: 4.1 K/MM3 (ref 4.8–10.8)

## 2021-01-03 PROCEDURE — 99284 EMERGENCY DEPT VISIT MOD MDM: CPT

## 2021-01-03 PROCEDURE — 85730 THROMBOPLASTIN TIME PARTIAL: CPT

## 2021-01-03 PROCEDURE — 85610 PROTHROMBIN TIME: CPT

## 2021-01-03 PROCEDURE — 43247 EGD REMOVE FOREIGN BODY: CPT

## 2021-01-03 PROCEDURE — 0DJ08ZZ INSPECTION OF UPPER INTESTINAL TRACT, VIA NATURAL OR ARTIFICIAL OPENING ENDOSCOPIC: ICD-10-PCS | Performed by: SURGERY

## 2021-01-03 PROCEDURE — 96365 THER/PROPH/DIAG IV INF INIT: CPT

## 2021-01-03 PROCEDURE — 96375 TX/PRO/DX INJ NEW DRUG ADDON: CPT

## 2021-01-03 PROCEDURE — 86328 IA NFCT AB SARSCOV2 COVID19: CPT

## 2021-01-03 PROCEDURE — 85025 COMPLETE CBC W/AUTO DIFF WBC: CPT

## 2021-01-03 PROCEDURE — 80048 BASIC METABOLIC PNL TOTAL CA: CPT

## 2021-02-15 RX ORDER — PROPRANOLOL HYDROCHLORIDE 160 MG/1
CAPSULE, EXTENDED RELEASE ORAL
Qty: 90 CAPSULE | Refills: 2 | Status: SHIPPED | OUTPATIENT
Start: 2021-02-15 | End: 2021-11-24

## 2021-02-15 RX ORDER — LANSOPRAZOLE 30 MG/1
CAPSULE, DELAYED RELEASE ORAL
Qty: 90 CAPSULE | Refills: 2 | Status: SHIPPED | OUTPATIENT
Start: 2021-02-15 | End: 2021-06-16 | Stop reason: SDUPTHER

## 2021-02-15 RX ORDER — RAMIPRIL 5 MG/1
CAPSULE ORAL
Qty: 90 CAPSULE | Refills: 2 | Status: SHIPPED | OUTPATIENT
Start: 2021-02-15 | End: 2021-07-20 | Stop reason: SDUPTHER

## 2021-04-09 ENCOUNTER — TRANSCRIBE ORDERS (OUTPATIENT)
Dept: ADMINISTRATIVE | Facility: HOSPITAL | Age: 66
End: 2021-04-09

## 2021-04-09 DIAGNOSIS — K44.9 DIAPHRAGMATIC HERNIA WITHOUT OBSTRUCTION OR GANGRENE: Primary | ICD-10-CM

## 2021-05-05 ENCOUNTER — APPOINTMENT (OUTPATIENT)
Dept: PREADMISSION TESTING | Facility: HOSPITAL | Age: 66
End: 2021-05-05

## 2021-06-01 DIAGNOSIS — E78.5 HYPERLIPIDEMIA LDL GOAL <70: Primary | ICD-10-CM

## 2021-06-02 ENCOUNTER — HOSPITAL ENCOUNTER (OUTPATIENT)
Age: 66
End: 2021-06-02
Payer: MEDICARE

## 2021-06-02 DIAGNOSIS — Z20.822: Primary | ICD-10-CM

## 2021-06-02 PROCEDURE — U0003 INFECTIOUS AGENT DETECTION BY NUCLEIC ACID (DNA OR RNA); SEVERE ACUTE RESPIRATORY SYNDROME CORONAVIRUS 2 (SARS-COV-2) (CORONAVIRUS DISEASE [COVID-19]), AMPLIFIED PROBE TECHNIQUE, MAKING USE OF HIGH THROUGHPUT TECHNOLOGIES AS DESCRIBED BY CMS-2020-01-R: HCPCS

## 2021-06-04 PROCEDURE — 88305 TISSUE EXAM BY PATHOLOGIST: CPT | Performed by: SURGERY

## 2021-06-04 RX ORDER — ALIROCUMAB 75 MG/ML
75 INJECTION, SOLUTION SUBCUTANEOUS
Qty: 6 PEN | Refills: 0 | Status: SHIPPED | OUTPATIENT
Start: 2021-06-04 | End: 2021-06-18 | Stop reason: SDUPTHER

## 2021-06-05 ENCOUNTER — HOSPITAL ENCOUNTER (OUTPATIENT)
Age: 66
End: 2021-06-05
Payer: MEDICARE

## 2021-06-05 DIAGNOSIS — Z11.52: Primary | ICD-10-CM

## 2021-06-05 PROCEDURE — U0003 INFECTIOUS AGENT DETECTION BY NUCLEIC ACID (DNA OR RNA); SEVERE ACUTE RESPIRATORY SYNDROME CORONAVIRUS 2 (SARS-COV-2) (CORONAVIRUS DISEASE [COVID-19]), AMPLIFIED PROBE TECHNIQUE, MAKING USE OF HIGH THROUGHPUT TECHNOLOGIES AS DESCRIBED BY CMS-2020-01-R: HCPCS

## 2021-06-06 ENCOUNTER — APPOINTMENT (OUTPATIENT)
Dept: PREADMISSION TESTING | Facility: HOSPITAL | Age: 66
End: 2021-06-06

## 2021-06-07 ENCOUNTER — LAB REQUISITION (OUTPATIENT)
Dept: LAB | Facility: HOSPITAL | Age: 66
End: 2021-06-07

## 2021-06-07 DIAGNOSIS — K44.9 DIAPHRAGMATIC HERNIA WITHOUT OBSTRUCTION OR GANGRENE: ICD-10-CM

## 2021-06-08 ENCOUNTER — HOSPITAL ENCOUNTER (OUTPATIENT)
Facility: HOSPITAL | Age: 66
Setting detail: HOSPITAL OUTPATIENT SURGERY
Discharge: HOME | End: 2021-06-08
Attending: SURGERY | Admitting: SURGERY

## 2021-06-08 ENCOUNTER — HOSPITAL ENCOUNTER (OUTPATIENT)
Dept: GENERAL RADIOLOGY | Facility: HOSPITAL | Age: 66
Discharge: HOME | End: 2021-06-08

## 2021-06-08 ENCOUNTER — HOSPITAL ENCOUNTER (OUTPATIENT)
Dept: NUCLEAR MEDICINE | Facility: HOSPITAL | Age: 66
Discharge: HOME | End: 2021-06-08

## 2021-06-08 DIAGNOSIS — K44.9 DIAPHRAGMATIC HERNIA WITHOUT OBSTRUCTION OR GANGRENE: ICD-10-CM

## 2021-06-08 LAB
CYTO UR: NORMAL
LAB AP CASE REPORT: NORMAL
LAB AP CLINICAL INFORMATION: NORMAL
PATH REPORT.FINAL DX SPEC: NORMAL
PATH REPORT.GROSS SPEC: NORMAL

## 2021-06-08 PROCEDURE — 91010 ESOPHAGUS MOTILITY STUDY: CPT | Performed by: SURGERY

## 2021-06-08 PROCEDURE — A9541 TC99M SULFUR COLLOID: HCPCS | Performed by: SURGERY

## 2021-06-08 PROCEDURE — 78264 GASTRIC EMPTYING IMG STUDY: CPT

## 2021-06-08 PROCEDURE — 0 TECHNETIUM SULFUR COLLOID: Performed by: SURGERY

## 2021-06-08 PROCEDURE — 74220 X-RAY XM ESOPHAGUS 1CNTRST: CPT

## 2021-06-08 RX ORDER — LIDOCAINE HYDROCHLORIDE 20 MG/ML
SOLUTION OROPHARYNGEAL AS NEEDED
Status: DISCONTINUED | OUTPATIENT
Start: 2021-06-08 | End: 2021-06-08 | Stop reason: HOSPADM

## 2021-06-08 RX ADMIN — TECHNETIUM TC 99M SULFUR COLLOID 1 DOSE: KIT at 12:10

## 2021-06-08 RX ADMIN — BARIUM SULFATE 183 ML: 960 POWDER, FOR SUSPENSION ORAL at 14:31

## 2021-06-12 ENCOUNTER — HOSPITAL ENCOUNTER (OUTPATIENT)
Age: 66
End: 2021-06-12
Payer: MEDICARE

## 2021-06-12 DIAGNOSIS — E78.5: Primary | ICD-10-CM

## 2021-06-12 LAB
ALBUMIN LEVEL: 3.9 G/DL (ref 3.5–5)
ALBUMIN/GLOB SERPL: 1.6 {RATIO} (ref 1.1–1.8)
ALP ISO SERPL-ACNC: 107 U/L (ref 38–126)
ALT SERPLBLD-CCNC: 22 U/L (ref 12–78)
ANION GAP SERPL CALC-SCNC: 10.5 MEQ/L (ref 5–15)
AST SERPL QL: 29 U/L (ref 17–59)
BILIRUBIN,TOTAL: 0.7 MG/DL (ref 0.2–1.3)
BUN SERPL-MCNC: 21 MG/DL (ref 9–20)
CALCIUM SPEC-MCNC: 8.9 MG/DL (ref 8.4–10.2)
CHLORIDE SPEC-SCNC: 107 MMOL/L (ref 98–107)
CHOLEST SPEC-SCNC: 133 MG/DL (ref 140–200)
CO2 SERPL-SCNC: 24 MMOL/L (ref 22–30)
CREAT BLD-SCNC: 0.9 MG/DL (ref 0.66–1.25)
ESTIMATED GLOMERULAR FILT RATE: 84 ML/MIN (ref 60–?)
GFR (AFRICAN AMERICAN): 102 ML/MIN (ref 60–?)
GLOBULIN SER CALC-MCNC: 2.4 G/DL (ref 1.3–3.2)
GLUCOSE: 88 MG/DL (ref 74–100)
HDLC SERPL-MCNC: 45 MG/DL (ref 40–60)
POTASSIUM: 4.5 MMOL/L (ref 3.5–5.1)
PROT SERPL-MCNC: 6.3 G/DL (ref 6.3–8.2)
SODIUM SPEC-SCNC: 137 MMOL/L (ref 136–145)
TRIGLYCERIDES: 113 MG/DL (ref 30–150)

## 2021-06-12 PROCEDURE — 80061 LIPID PANEL: CPT

## 2021-06-12 PROCEDURE — 80053 COMPREHEN METABOLIC PANEL: CPT

## 2021-06-16 ENCOUNTER — OFFICE VISIT (OUTPATIENT)
Dept: CARDIOLOGY | Facility: CLINIC | Age: 66
End: 2021-06-16

## 2021-06-16 VITALS
OXYGEN SATURATION: 98 % | SYSTOLIC BLOOD PRESSURE: 132 MMHG | HEART RATE: 67 BPM | WEIGHT: 232 LBS | BODY MASS INDEX: 28.25 KG/M2 | DIASTOLIC BLOOD PRESSURE: 82 MMHG | HEIGHT: 76 IN

## 2021-06-16 DIAGNOSIS — I99.8 SILENT ISCHEMIA: ICD-10-CM

## 2021-06-16 DIAGNOSIS — E78.5 HYPERLIPIDEMIA LDL GOAL <70: ICD-10-CM

## 2021-06-16 DIAGNOSIS — I10 ESSENTIAL HYPERTENSION: ICD-10-CM

## 2021-06-16 DIAGNOSIS — I25.810 CORONARY ARTERY DISEASE INVOLVING CORONARY BYPASS GRAFT OF NATIVE HEART WITHOUT ANGINA PECTORIS: Primary | ICD-10-CM

## 2021-06-16 DIAGNOSIS — R94.31 ABNORMAL ELECTROCARDIOGRAM (ECG) (EKG): ICD-10-CM

## 2021-06-16 PROCEDURE — 93000 ELECTROCARDIOGRAM COMPLETE: CPT | Performed by: NURSE PRACTITIONER

## 2021-06-16 PROCEDURE — 99213 OFFICE O/P EST LOW 20 MIN: CPT | Performed by: NURSE PRACTITIONER

## 2021-06-16 RX ORDER — LANSOPRAZOLE 30 MG/1
30 CAPSULE, DELAYED RELEASE ORAL DAILY
Qty: 90 CAPSULE | Refills: 0 | Status: SHIPPED | OUTPATIENT
Start: 2021-06-16 | End: 2021-11-24

## 2021-06-16 RX ORDER — LANSOPRAZOLE 30 MG/1
30 CAPSULE, DELAYED RELEASE ORAL DAILY
Qty: 90 CAPSULE | Refills: 0 | Status: SHIPPED | OUTPATIENT
Start: 2021-06-16 | End: 2021-06-16

## 2021-06-18 RX ORDER — ALIROCUMAB 75 MG/ML
75 INJECTION, SOLUTION SUBCUTANEOUS
Qty: 6 PEN | Refills: 0 | Status: SHIPPED | OUTPATIENT
Start: 2021-06-18 | End: 2021-11-12

## 2021-07-20 RX ORDER — RAMIPRIL 5 MG/1
5 CAPSULE ORAL DAILY
Qty: 90 CAPSULE | Refills: 2 | Status: SHIPPED | OUTPATIENT
Start: 2021-07-20 | End: 2022-03-10 | Stop reason: SDUPTHER

## 2021-08-09 ENCOUNTER — HOSPITAL ENCOUNTER (EMERGENCY)
Age: 66
Discharge: HOME | End: 2021-08-09
Payer: MEDICARE

## 2021-08-09 VITALS
RESPIRATION RATE: 16 BRPM | HEART RATE: 63 BPM | DIASTOLIC BLOOD PRESSURE: 84 MMHG | SYSTOLIC BLOOD PRESSURE: 139 MMHG | TEMPERATURE: 97.88 F

## 2021-08-09 VITALS — HEART RATE: 60 BPM | TEMPERATURE: 98.2 F | OXYGEN SATURATION: 97 % | RESPIRATION RATE: 18 BRPM

## 2021-08-09 VITALS — BODY MASS INDEX: 27.5 KG/M2

## 2021-08-09 DIAGNOSIS — B34.9: Primary | ICD-10-CM

## 2021-08-09 DIAGNOSIS — I48.0: ICD-10-CM

## 2021-08-09 DIAGNOSIS — Z20.822: ICD-10-CM

## 2021-08-09 DIAGNOSIS — Z87.891: ICD-10-CM

## 2021-08-09 DIAGNOSIS — I10: ICD-10-CM

## 2021-08-09 PROCEDURE — U0003 INFECTIOUS AGENT DETECTION BY NUCLEIC ACID (DNA OR RNA); SEVERE ACUTE RESPIRATORY SYNDROME CORONAVIRUS 2 (SARS-COV-2) (CORONAVIRUS DISEASE [COVID-19]), AMPLIFIED PROBE TECHNIQUE, MAKING USE OF HIGH THROUGHPUT TECHNOLOGIES AS DESCRIBED BY CMS-2020-01-R: HCPCS

## 2021-08-09 PROCEDURE — G0463 HOSPITAL OUTPT CLINIC VISIT: HCPCS

## 2021-08-09 PROCEDURE — 99202 OFFICE O/P NEW SF 15 MIN: CPT

## 2021-10-04 ENCOUNTER — HOSPITAL ENCOUNTER (OUTPATIENT)
Dept: CARDIOLOGY | Facility: HOSPITAL | Age: 66
Discharge: HOME | End: 2021-10-04
Admitting: NURSE PRACTITIONER

## 2021-10-04 VITALS
SYSTOLIC BLOOD PRESSURE: 146 MMHG | DIASTOLIC BLOOD PRESSURE: 90 MMHG | HEART RATE: 80 BPM | BODY MASS INDEX: 28.25 KG/M2 | HEIGHT: 76 IN | WEIGHT: 232 LBS

## 2021-10-04 DIAGNOSIS — R94.31 ABNORMAL ELECTROCARDIOGRAM (ECG) (EKG): ICD-10-CM

## 2021-10-04 DIAGNOSIS — I99.8 SILENT ISCHEMIA: ICD-10-CM

## 2021-10-04 DIAGNOSIS — I25.810 CORONARY ARTERY DISEASE INVOLVING CORONARY BYPASS GRAFT OF NATIVE HEART WITHOUT ANGINA PECTORIS: ICD-10-CM

## 2021-10-04 LAB
BH CV REST NUCLEAR ISOTOPE DOSE: 9.8 MCI
BH CV STRESS BP STAGE 1: NORMAL
BH CV STRESS DURATION MIN STAGE 1: 3
BH CV STRESS DURATION MIN STAGE 2: 1
BH CV STRESS DURATION SEC STAGE 1: 0
BH CV STRESS DURATION SEC STAGE 2: 46
BH CV STRESS GRADE STAGE 1: 10
BH CV STRESS GRADE STAGE 2: 12
BH CV STRESS HR STAGE 1: 133
BH CV STRESS HR STAGE 2: 150
BH CV STRESS METS STAGE 1: 5
BH CV STRESS METS STAGE 2: 7.5
BH CV STRESS NUCLEAR ISOTOPE DOSE: 32.8 MCI
BH CV STRESS O2 STAGE 1: 96
BH CV STRESS O2 STAGE 2: 96
BH CV STRESS PROTOCOL 1: NORMAL
BH CV STRESS RECOVERY BP: NORMAL MMHG
BH CV STRESS RECOVERY HR: 93 BPM
BH CV STRESS RECOVERY O2: 98 %
BH CV STRESS SPEED STAGE 1: 1.7
BH CV STRESS SPEED STAGE 2: 2.5
BH CV STRESS STAGE 1: 1
BH CV STRESS STAGE 2: 2
LV EF NUC BP: 80 %
MAXIMAL PREDICTED HEART RATE: 153 BPM
PERCENT MAX PREDICTED HR: 98.69 %
STRESS BASELINE BP: NORMAL MMHG
STRESS BASELINE HR: 82 BPM
STRESS O2 SAT REST: 97 %
STRESS PERCENT HR: 116 %
STRESS POST ESTIMATED WORKLOAD: 6.3 METS
STRESS POST EXERCISE DUR MIN: 4 MIN
STRESS POST EXERCISE DUR SEC: 46 SEC
STRESS POST O2 SAT PEAK: 96 %
STRESS POST PEAK BP: NORMAL MMHG
STRESS POST PEAK HR: 151 BPM
STRESS TARGET HR: 130 BPM

## 2021-10-04 PROCEDURE — 78452 HT MUSCLE IMAGE SPECT MULT: CPT

## 2021-10-04 PROCEDURE — 93018 CV STRESS TEST I&R ONLY: CPT | Performed by: INTERNAL MEDICINE

## 2021-10-04 PROCEDURE — 78452 HT MUSCLE IMAGE SPECT MULT: CPT | Performed by: INTERNAL MEDICINE

## 2021-10-04 PROCEDURE — 0 TECHNETIUM SESTAMIBI: Performed by: NURSE PRACTITIONER

## 2021-10-04 PROCEDURE — 93017 CV STRESS TEST TRACING ONLY: CPT

## 2021-10-04 PROCEDURE — A9500 TC99M SESTAMIBI: HCPCS | Performed by: NURSE PRACTITIONER

## 2021-10-04 RX ADMIN — TECHNETIUM TC 99M SESTAMIBI 1 DOSE: 1 INJECTION INTRAVENOUS at 10:42

## 2021-10-04 RX ADMIN — TECHNETIUM TC 99M SESTAMIBI 1 DOSE: 1 INJECTION INTRAVENOUS at 07:52

## 2021-10-29 ENCOUNTER — HOSPITAL ENCOUNTER (OUTPATIENT)
Age: 66
End: 2021-10-29
Payer: MEDICARE

## 2021-10-29 DIAGNOSIS — Z20.822: Primary | ICD-10-CM

## 2021-10-29 PROCEDURE — U0003 INFECTIOUS AGENT DETECTION BY NUCLEIC ACID (DNA OR RNA); SEVERE ACUTE RESPIRATORY SYNDROME CORONAVIRUS 2 (SARS-COV-2) (CORONAVIRUS DISEASE [COVID-19]), AMPLIFIED PROBE TECHNIQUE, MAKING USE OF HIGH THROUGHPUT TECHNOLOGIES AS DESCRIBED BY CMS-2020-01-R: HCPCS

## 2021-10-29 PROCEDURE — U0005 INFEC AGEN DETEC AMPLI PROBE: HCPCS

## 2021-10-29 PROCEDURE — C9803 HOPD COVID-19 SPEC COLLECT: HCPCS

## 2021-11-12 RX ORDER — ALIROCUMAB 75 MG/ML
INJECTION, SOLUTION SUBCUTANEOUS
Qty: 2 PEN | Refills: 1 | Status: SHIPPED | OUTPATIENT
Start: 2021-11-12 | End: 2021-11-30 | Stop reason: SDUPTHER

## 2021-11-24 RX ORDER — PROPRANOLOL HYDROCHLORIDE 160 MG/1
CAPSULE, EXTENDED RELEASE ORAL
Qty: 90 CAPSULE | Refills: 2 | Status: SHIPPED | OUTPATIENT
Start: 2021-11-24 | End: 2022-06-20

## 2021-11-24 RX ORDER — LANSOPRAZOLE 30 MG/1
30 CAPSULE, DELAYED RELEASE ORAL DAILY
Qty: 90 CAPSULE | Refills: 0 | Status: SHIPPED | OUTPATIENT
Start: 2021-11-24

## 2021-11-30 RX ORDER — ALIROCUMAB 75 MG/ML
75 INJECTION, SOLUTION SUBCUTANEOUS
Qty: 6 PEN | Refills: 2 | Status: SHIPPED | OUTPATIENT
Start: 2021-11-30 | End: 2022-02-08 | Stop reason: SDUPTHER

## 2022-02-08 RX ORDER — ALIROCUMAB 75 MG/ML
75 INJECTION, SOLUTION SUBCUTANEOUS
Qty: 6 PEN | Refills: 2 | Status: SHIPPED | OUTPATIENT
Start: 2022-02-08 | End: 2022-09-22 | Stop reason: SDUPTHER

## 2022-03-10 RX ORDER — RAMIPRIL 5 MG/1
5 CAPSULE ORAL DAILY
Qty: 90 CAPSULE | Refills: 2 | Status: SHIPPED | OUTPATIENT
Start: 2022-03-10 | End: 2022-12-27

## 2022-06-10 NOTE — PROGRESS NOTES
[facility] Cardiology    Patient ID: Jeremias Orr is a 66 y.o. male.  : 1955   Contact: [phone]    Encounter date: 2021    PCP: Ted Jackson MD      Chief complaint:   Chief Complaint   Patient presents with   • Coronary artery disease involving coronary bypass graft of n     Problem List:  1. Coronary artery disease:  a. Inferior MI, s/p TPA therapy on 2001 -- data deficit.   b. Multivessel disease by cardiac catheterization in 2006.   c. CABG x5, 01/15/2006, Dr. Poli Madrid: LIMA to the LAD and diagonal, CATERINA from the LIMA as a pedicle graft sequentially to OM1 and the PLB, SVG to the PDA.   d. Cardiac GXT, 2006: Anterior myocardial perfusion defect. Normal EF.  e. [facility], 2006: S/p PTCA and stent placement to the LCx using a 2.5 x 13 mm Cypher GABINO.   2. Previous tobacco abuse, cessation 20+ years ago.  3. Hypertension.   4. Dyslipidemia.   a. FLP 21: , Trig 113, LDL 67.14, HDL 45.   5. Migraine headaches.   6. Erosive/ulcerative esophagitis.   7. Schatzki's ring.  8. Residual gastroesophageal reflux disease with mild dysphagia.   9. Nonspecific generalized joint pain.   10. Surgical history:    a. Right knee surgery.   b. Left wrist reconstruction.   c. Inguinal hernia repair.   d. Incisional hernia  repair.   e. Esophageal dilatation.   f. Coronary artery bypass grafting.   g. Cholecystectomy, 2008.    Allergies   Allergen Reactions   • Crestor [Rosuvastatin Calcium]      leg cramps and weakness after a year.    • Lipitor [Atorvastatin]    • Livalo [Pitavastatin]       leg cramps and weakness.    • Vytorin [Ezetimibe-Simvastatin]        Current Medications:    Current Outpatient Medications:   •  Alirocumab (Praluent) 75 MG/ML solution auto-injector, 75 mg by Subconjunctival route Every 14 (Fourteen) Days., Disp: 6 pen, Rfl: 0  •  aspirin 325 MG tablet, Take 325 mg by mouth Daily., Disp: , Rfl:   •  cetirizine  "(zyrTEC) 10 MG tablet, Take 10 mg by mouth Daily., Disp: , Rfl:   •  FIBER PO, Take  by mouth Daily., Disp: , Rfl:   •  lansoprazole (PREVACID) 30 MG capsule, Take 1 capsule by mouth Daily., Disp: 90 capsule, Rfl: 0  •  naproxen sodium (ALEVE) 220 MG tablet, Take 220 mg by mouth Every Other Day., Disp: , Rfl:   •  nitroglycerin (NITROSTAT) 0.4 MG SL tablet, Place 1 tablet under the tongue Every 5 (Five) Minutes As Needed for Chest Pain. Take no more than 3 doses in 15 minutes., Disp: 25 tablet, Rfl: 11  •  propranolol LA (INDERAL LA) 160 MG 24 hr capsule, TAKE 1 CAPSULE DAILY, Disp: 90 capsule, Rfl: 2  •  ramipril (ALTACE) 5 MG capsule, TAKE 1 CAPSULE DAILY, Disp: 90 capsule, Rfl: 2    HPI    Jeremias Orr is a 66 y.o. male who presents today for an annual follow up of coronary artery disease and cardiac risk factors. Since last visit, patient has done well from a cardiovascular standpoint. He has a history of silent ischemia and has not had a recent stress test. In addition, he reports that he will be having surgery for hiatal hernia in the fall (likely late October) and will need cardiac clearance. BP has been well-controlled. Cholesterol checked earlier this month and is acceptable.        The following portions of the patient's history were reviewed and updated as appropriate: allergies, current medications and problem list.    Pertinent positives as listed in the HPI.  All other systems reviewed are negative.         Vitals:    06/16/21 0957   BP: 132/82   BP Location: Left arm   Patient Position: Sitting   Pulse: 67   SpO2: 98%   Weight: 105 kg (232 lb)   Height: 193 cm (76\")       Physical Exam:  General: Alert and oriented.  Neck: Jugular venous pressure is within normal limits. Carotids have normal upstrokes without bruits.   Cardiovascular: Heart has a nondisplaced focal PMI. Regular rate and rhythm. No murmur, gallop or rub.  Lungs: Clear, no rales or wheezes. Equal expansion is noted.   Extremities: " Show no edema.  Skin: Warm and dry.  Neurologic: Nonfocal.     Diagnostic Data (reviewed with patient):     Lab date: 6/12/21  · FLP: , , HDL 45, LDL 67.14      ECG 12 Lead    Date/Time: 6/16/2021 10:28 AM  Performed by: Vania Stallworth APRN  Authorized by: Vania Stallworth APRN   Comparison: not compared with previous ECG   Rhythm: sinus rhythm  BPM: 61  Conduction: 1st degree AV block              Assessment:    ICD-10-CM ICD-9-CM   1. Coronary artery disease involving coronary bypass graft of native heart without angina pectoris  I25.810 414.05   2. Essential hypertension  I10 401.9   3. Hyperlipidemia LDL goal <70  E78.5 272.4         Plan:  1. Continue on aspirin 325 mg daily for CAD.   2. Continue on propranolol  mg and ramipril 5 mg for hypertension.   3. Continue Praluent for hyperlipidemia.   4. Patient was counseled to begin aerobic exercise 30 min per day for at least 4 days per week.   5. Schedule nuclear exercise stress test for cardiac clearance and with a history of silent ischemia.   6. Continue all other current medications.  7. F/up in 12 months, sooner if needed.      Electronically signed by CARO Vee, 06/16/21, 10:27 AM EDT.               pcp

## 2022-06-20 RX ORDER — PROPRANOLOL HYDROCHLORIDE 160 MG/1
CAPSULE, EXTENDED RELEASE ORAL
Qty: 90 CAPSULE | Refills: 0 | Status: SHIPPED | OUTPATIENT
Start: 2022-06-20 | End: 2022-08-15

## 2022-06-22 ENCOUNTER — OFFICE VISIT (OUTPATIENT)
Dept: CARDIOLOGY | Facility: CLINIC | Age: 67
End: 2022-06-22

## 2022-06-22 VITALS
BODY MASS INDEX: 28.25 KG/M2 | HEIGHT: 76 IN | OXYGEN SATURATION: 96 % | DIASTOLIC BLOOD PRESSURE: 70 MMHG | WEIGHT: 232 LBS | SYSTOLIC BLOOD PRESSURE: 124 MMHG | HEART RATE: 59 BPM

## 2022-06-22 DIAGNOSIS — I25.810 CORONARY ARTERY DISEASE INVOLVING CORONARY BYPASS GRAFT OF NATIVE HEART WITHOUT ANGINA PECTORIS: Primary | ICD-10-CM

## 2022-06-22 DIAGNOSIS — E78.5 HYPERLIPIDEMIA LDL GOAL <70: ICD-10-CM

## 2022-06-22 DIAGNOSIS — I10 ESSENTIAL HYPERTENSION: ICD-10-CM

## 2022-06-22 PROCEDURE — 93000 ELECTROCARDIOGRAM COMPLETE: CPT | Performed by: INTERNAL MEDICINE

## 2022-06-22 PROCEDURE — 99214 OFFICE O/P EST MOD 30 MIN: CPT | Performed by: INTERNAL MEDICINE

## 2022-06-22 NOTE — PROGRESS NOTES
[facility] Cardiology    Patient ID: Jeremias Orr is a 67 y.o. male.  : 1955   Contact: [phone]    Encounter date: 2022    PCP: Ted Jackson MD      Chief complaint:   Chief Complaint   Patient presents with   • Coronary artery disease involving coronary bypass graft of    • Surgical Clearance     abdominal       Problem List:  1. Coronary artery disease:  a. Inferior MI, s/p TPA therapy on 2001 -- data deficit.   b. Multivessel disease by cardiac catheterization in 2006.   c. CABG x5, 01/15/2006, Dr. Poli Madrid: LIMA to the LAD and diagonal, CATERINA from the LIMA as a pedicle graft sequentially to OM1 and the PLB, SVG to the PDA.   d. Cardiac GXT, 2006: Anterior myocardial perfusion defect. Normal EF.  e. [facility], 2006: S/p PTCA and stent placement to the LCx using a 2.5 x 13 mm Cypher GABINO.   2. Silent ischemia  a. MPS, 10/04/2021; EF 80%. When the stress and rest Cardiolite images were compared a small area of fixed inferoposterior hypoperfusion was seen. No evidence of inducible ischemia by clinical, electrocardiographic or scintigraphic criteria. This is a low risk study for future cardiac events. The patient may proceed with surgery at a low risk cardiac risk.   3. Previous tobacco abuse, cessation 20+ years ago.  4. Hypertension.   5. Dyslipidemia.   a. FLP 21: , Trig 113, LDL 67.14, HDL 45.   6. Migraine headaches.   7. Erosive/ulcerative esophagitis.  8. Schatzki's ring.  9. Residual gastroesophageal reflux disease with mild dysphagia.   10. Nonspecific generalized joint pain.   11. Surgical history:    a. Right knee surgery.   b. Left wrist reconstruction.   c. Inguinal hernia repair.   d. Incisional hernia repair.   e. Esophageal dilatation.   f. Coronary artery bypass grafting.   g. Cholecystectomy, 2008.    Allergies   Allergen Reactions   • Crestor [Rosuvastatin Calcium]      leg cramps and weakness after a  year.    • Lipitor [Atorvastatin]    • Livalo [Pitavastatin]       leg cramps and weakness.    • Vytorin [Ezetimibe-Simvastatin]        Current Medications:    Current Outpatient Medications:   •  Alirocumab (Praluent) 75 MG/ML solution auto-injector, Inject 1 mL under the skin into the appropriate area as directed Every 14 (Fourteen) Days., Disp: 6 pen, Rfl: 2  •  aspirin 325 MG tablet, Take 325 mg by mouth Daily., Disp: , Rfl:   •  cetirizine (zyrTEC) 10 MG tablet, Take 10 mg by mouth Daily., Disp: , Rfl:   •  FIBER PO, Take  by mouth Daily., Disp: , Rfl:   •  lansoprazole (PREVACID) 30 MG capsule, Take 1 capsule by mouth Daily. Further refills from pcp or gi, Disp: 90 capsule, Rfl: 0  •  naproxen sodium (ALEVE) 220 MG tablet, Take 220 mg by mouth Daily., Disp: , Rfl:   •  nitroglycerin (NITROSTAT) 0.4 MG SL tablet, Place 1 tablet under the tongue Every 5 (Five) Minutes As Needed for Chest Pain. Take no more than 3 doses in 15 minutes., Disp: 25 tablet, Rfl: 11  •  propranolol LA (INDERAL LA) 160 MG 24 hr capsule, TAKE 1 CAPSULE DAILY, Disp: 90 capsule, Rfl: 0  •  ramipril (ALTACE) 5 MG capsule, Take 1 capsule by mouth Daily., Disp: 90 capsule, Rfl: 2    HPI    Jeremias Orr is a 67 y.o. male who presents today for an annual follow up of coronary artery disease and cardiac risk factors. Since last visit, the patient has been doing well overall from a cardiovascular standpoint. He states he has upcoming diaphragmatic hernia surgery in August 2022 by Dr. Pitts. Patient asked about taking his medication after the hernia procedure since he will need to be on a liquid diet for about a week to 10 days. He states he does get plenty of exercise but he can feel the pressure on his chest from the hernia which makes it difficult to breath. He does report some bilateral lower extremity edema in the afternoon after he has been on his feet all day. Patient denies chest pain, orthopnea, palpitations, edema, dizziness, and  "syncope.     The following portions of the patient's history were reviewed and updated as appropriate: allergies, current medications and problem list.    Pertinent positives as listed in the HPI.  All other systems reviewed are negative.         Vitals:    06/22/22 1253   BP: 124/70   BP Location: Left arm   Patient Position: Sitting   Cuff Size: Adult   Pulse: 59   SpO2: 96%   Weight: 105 kg (232 lb)   Height: 193 cm (76\")       Physical Exam:  General: Alert and oriented.  Neck: Jugular venous pressure is within normal limits. Carotids have normal upstrokes without bruits.   Cardiovascular: Heart has a nondisplaced focal PMI. Regular rate and rhythm. No murmur, gallop or rub.  Lungs: Clear, no rales or wheezes. Equal expansion is noted.   Extremities: Show no edema.  Skin: Warm and dry.  Neurologic: Nonfocal.     Diagnostic Data (reviewed with patient):    Lab date: 6/12/21  · FLP: TC 133, TG 113, HDL 45, LDL 67.14        ECG 12 Lead    Date/Time: 6/22/2022 1:25 PM  Performed by: Magdalene Scott MD  Authorized by: Magdalene Scott MD   Comparison: compared with previous ECG from 6/16/2021  Similar to previous ECG  Rhythm: sinus bradycardia  BPM: 59  Conduction: 1st degree AV block  Other findings: left ventricular hypertrophy  Comments: Minimal voltage for LVH  Borderline ECG              Assessment:    ICD-10-CM ICD-9-CM   1. Coronary artery disease involving coronary bypass graft of native heart without angina pectoris  I25.810 414.05   2. Essential hypertension  I10 401.9   3. Hyperlipidemia LDL goal <70  E78.5 272.4     Patient was given a prescription for propanolol 80 mg (short acting) every 12 hours for post op if needed. He may crush the tablet and add it to a liquid to take. Patient was advised he will likely not need his ramipril 5 mg in the immediate postop.  And take a chewable tablet of aspirin 81 mg while being on the liquid diet after his procedure.       Plan:  1. Patient is low " cardiac risk for upcoming hernia surgery from a cardiac standpoint.   2. Continue on aspirin 81 mg for antiplatelet therapy.   3. Continue on nitroglycerin 0.4 mg as needed for angina.   4. Continue on ramipril 5 mg daily for hypertension.    5. Continue on propanolol 160 mg q24h for migraines and hypertension.   6. Continue all other current medications.  7. F/up in 12 months, sooner if needed.    Scribed for Magdalene Scott MD by Beverley Amezquita. 6/22/2022 13:06 EDT      I Magdalene Scott MD personally performed the services described in this documentation as scribed by the above individual in my presence, and it is both accurate and complete.    Magdalene Scott MD, FACC

## 2022-06-25 ENCOUNTER — HOSPITAL ENCOUNTER (EMERGENCY)
Age: 67
Discharge: HOME | End: 2022-06-25
Payer: MEDICARE

## 2022-06-25 VITALS
SYSTOLIC BLOOD PRESSURE: 115 MMHG | OXYGEN SATURATION: 98 % | HEART RATE: 78 BPM | TEMPERATURE: 98.5 F | RESPIRATION RATE: 19 BRPM | DIASTOLIC BLOOD PRESSURE: 70 MMHG

## 2022-06-25 VITALS
SYSTOLIC BLOOD PRESSURE: 115 MMHG | OXYGEN SATURATION: 98 % | RESPIRATION RATE: 19 BRPM | HEART RATE: 78 BPM | DIASTOLIC BLOOD PRESSURE: 70 MMHG | TEMPERATURE: 98.42 F

## 2022-06-25 VITALS — BODY MASS INDEX: 31 KG/M2

## 2022-06-25 DIAGNOSIS — U07.1: Primary | ICD-10-CM

## 2022-06-25 PROCEDURE — C9803 HOPD COVID-19 SPEC COLLECT: HCPCS

## 2022-06-25 PROCEDURE — G0463 HOSPITAL OUTPT CLINIC VISIT: HCPCS

## 2022-06-25 PROCEDURE — U0005 INFEC AGEN DETEC AMPLI PROBE: HCPCS

## 2022-06-25 PROCEDURE — 99212 OFFICE O/P EST SF 10 MIN: CPT

## 2022-06-25 PROCEDURE — U0003 INFECTIOUS AGENT DETECTION BY NUCLEIC ACID (DNA OR RNA); SEVERE ACUTE RESPIRATORY SYNDROME CORONAVIRUS 2 (SARS-COV-2) (CORONAVIRUS DISEASE [COVID-19]), AMPLIFIED PROBE TECHNIQUE, MAKING USE OF HIGH THROUGHPUT TECHNOLOGIES AS DESCRIBED BY CMS-2020-01-R: HCPCS

## 2022-08-15 ENCOUNTER — PRE-ADMISSION TESTING (OUTPATIENT)
Dept: PREADMISSION TESTING | Facility: HOSPITAL | Age: 67
End: 2022-08-15

## 2022-08-15 VITALS — WEIGHT: 230.6 LBS | HEIGHT: 76 IN | BODY MASS INDEX: 28.08 KG/M2

## 2022-08-15 LAB
ALBUMIN SERPL-MCNC: 4.2 G/DL (ref 3.5–5.2)
ALBUMIN/GLOB SERPL: 2 G/DL
ALP SERPL-CCNC: 137 U/L (ref 39–117)
ALT SERPL W P-5'-P-CCNC: 23 U/L (ref 1–41)
ANION GAP SERPL CALCULATED.3IONS-SCNC: 5 MMOL/L (ref 5–15)
AST SERPL-CCNC: 24 U/L (ref 1–40)
BILIRUB SERPL-MCNC: 0.5 MG/DL (ref 0–1.2)
BUN SERPL-MCNC: 10 MG/DL (ref 8–23)
BUN/CREAT SERPL: 9.5 (ref 7–25)
CALCIUM SPEC-SCNC: 9.1 MG/DL (ref 8.6–10.5)
CHLORIDE SERPL-SCNC: 105 MMOL/L (ref 98–107)
CHOLEST SERPL-MCNC: 184 MG/DL (ref 0–200)
CO2 SERPL-SCNC: 30 MMOL/L (ref 22–29)
CREAT SERPL-MCNC: 1.05 MG/DL (ref 0.76–1.27)
DEPRECATED RDW RBC AUTO: 42.6 FL (ref 37–54)
EGFRCR SERPLBLD CKD-EPI 2021: 77.3 ML/MIN/1.73
ERYTHROCYTE [DISTWIDTH] IN BLOOD BY AUTOMATED COUNT: 12.8 % (ref 12.3–15.4)
GLOBULIN UR ELPH-MCNC: 2.1 GM/DL
GLUCOSE SERPL-MCNC: 97 MG/DL (ref 65–99)
HBA1C MFR BLD: 5.5 % (ref 4.8–5.6)
HCT VFR BLD AUTO: 41.7 % (ref 37.5–51)
HDLC SERPL-MCNC: 37 MG/DL (ref 40–60)
HGB BLD-MCNC: 14 G/DL (ref 13–17.7)
INR PPP: 0.99 (ref 0.84–1.13)
LDLC SERPL CALC-MCNC: 125 MG/DL (ref 0–100)
LDLC/HDLC SERPL: 3.33 {RATIO}
MCH RBC QN AUTO: 30.4 PG (ref 26.6–33)
MCHC RBC AUTO-ENTMCNC: 33.6 G/DL (ref 31.5–35.7)
MCV RBC AUTO: 90.7 FL (ref 79–97)
PLATELET # BLD AUTO: 193 10*3/MM3 (ref 140–450)
PMV BLD AUTO: 9.9 FL (ref 6–12)
POTASSIUM SERPL-SCNC: 4.9 MMOL/L (ref 3.5–5.2)
PROT SERPL-MCNC: 6.3 G/DL (ref 6–8.5)
PROTHROMBIN TIME: 13 SECONDS (ref 11.4–14.4)
RBC # BLD AUTO: 4.6 10*6/MM3 (ref 4.14–5.8)
SODIUM SERPL-SCNC: 140 MMOL/L (ref 136–145)
TRIGL SERPL-MCNC: 119 MG/DL (ref 0–150)
VLDLC SERPL-MCNC: 22 MG/DL (ref 5–40)
WBC NRBC COR # BLD: 3.76 10*3/MM3 (ref 3.4–10.8)

## 2022-08-15 PROCEDURE — 83036 HEMOGLOBIN GLYCOSYLATED A1C: CPT

## 2022-08-15 PROCEDURE — 85027 COMPLETE CBC AUTOMATED: CPT

## 2022-08-15 PROCEDURE — 80053 COMPREHEN METABOLIC PANEL: CPT

## 2022-08-15 PROCEDURE — 36415 COLL VENOUS BLD VENIPUNCTURE: CPT

## 2022-08-15 PROCEDURE — 85610 PROTHROMBIN TIME: CPT

## 2022-08-15 PROCEDURE — 80061 LIPID PANEL: CPT

## 2022-08-15 RX ORDER — PROPRANOLOL HYDROCHLORIDE 80 MG/1
80 TABLET ORAL 2 TIMES DAILY
COMMUNITY
End: 2022-11-07 | Stop reason: ALTCHOICE

## 2022-08-15 NOTE — PAT
An arrival time for procedure was not given during PAT visit. If patient had any questions or concerns about their arrival time, they were instructed to contact their surgeon/physician.  Additionally, if the patient referred to an arrival time that was acquired from their my chart account, patient was encouraged to verify that time with their surgeon/physician.  NO arrival times given in Pre Admission Testing Department.    Patient instructed to drink 20 ounces (or until full) of Gatorade and it needs to be completed 1 hour (for Main OR patients) or 2 hours (scheduled  section patients) before given arrival time for procedure (NO RED Gatorade)    Patient verbalized understanding.    Patient to apply Chlorhexadine wipes  to surgical area (as instructed) the night before procedure and the AM of procedure. Wipes provided.    Patient denies any current skin issues.     Patient viewed general PAT education video as instructed in their preoperative information received from their surgeon.  Patient stated the general PAT education video was viewed in its entirety and survey completed.  Copies of [facility] general education handouts (Incentive Spirometry, Meds to Beds Program, Patient Belongings, Pre-op skin preparation instructions, Blood Glucose testing, Visitor policy, Surgery FAQ, Code H) distributed to patient if not printed. Education related to the PAT pass and skin preparation for surgery (if applicable) completed in PAT as a reinforcement to PAT education video. Patient instructed to return PAT pass provided today as well as completed skin preparation sheet (if applicable) on the day of procedure.     Additionally if patient had not viewed video yet but intended to view it at home or in our waiting area, then referred them to the handout with QR code/link provided during PAT visit.  Instructed patient to complete survey after viewing the video in its entirety.  Encouraged patient/family to read [facility] general  education handouts thoroughly and notify PAT staff with any questions or concerns. Patient verbalized understanding of all information and priority content.    EKG AND CARDIAC RISK ASSESSMENT ON CHART FROM DR. STAHL FROM 6/22/22.    PT HAD ONSET OF DIARRHEA/LOOSE STOOL ON 8/10/22. PT DENIES FEVER. ABLE TO KEEP SOLIDS/FLUIDS DOWN. REPORTED ABOVE TO SUN PATINO IN DR. SWANSON'S OFFICE. PER DR. SWANSON, MIL[place]RO TO PROCEED. ALSO REPORTED PT IS TAKING ASA 325MG AND TOOK A DOSE TODAY. PER DR. SWANSON, [place] TO PROCEED.

## 2022-08-15 NOTE — PAT
1217 T/O DR. SARAH SWANSON/SUN PATINO/MAGO MAIN RN. IF PATIENT CAN GIVE A STOOL SAMPLE WHILE IN PAT, OBTAIN A C-DIFF TEST. IF UNABLE TO OBTAIN WHILE IN PAT, DO NOT ORDER.  READ BACK AND VERIFIED.     1257-PT UNABLE TO GIVE A STOOL SAMPLE IN PAT. C-DIFF ORDER NOT PLACED OR OBTAINED.

## 2022-08-16 ENCOUNTER — HOSPITAL ENCOUNTER (OUTPATIENT)
Facility: HOSPITAL | Age: 67
Discharge: HOME | End: 2022-08-18
Attending: SURGERY | Admitting: SURGERY

## 2022-08-16 ENCOUNTER — ANESTHESIA EVENT (OUTPATIENT)
Dept: PERIOP | Facility: HOSPITAL | Age: 67
End: 2022-08-16

## 2022-08-16 ENCOUNTER — APPOINTMENT (OUTPATIENT)
Dept: GENERAL RADIOLOGY | Facility: HOSPITAL | Age: 67
End: 2022-08-16

## 2022-08-16 ENCOUNTER — ANESTHESIA (OUTPATIENT)
Dept: PERIOP | Facility: HOSPITAL | Age: 67
End: 2022-08-16

## 2022-08-16 DIAGNOSIS — K44.9 PARAESOPHAGEAL HERNIA: Primary | ICD-10-CM

## 2022-08-16 DIAGNOSIS — K44.9 HIATAL HERNIA: ICD-10-CM

## 2022-08-16 PROCEDURE — 25010000002 FENTANYL CITRATE (PF) 50 MCG/ML SOLUTION

## 2022-08-16 PROCEDURE — 25010000002 DROPERIDOL PER 5 MG

## 2022-08-16 PROCEDURE — G0378 HOSPITAL OBSERVATION PER HR: HCPCS

## 2022-08-16 PROCEDURE — 43282 LAP PARAESOPH HER RPR W/MESH: CPT

## 2022-08-16 PROCEDURE — C1781 MESH (IMPLANTABLE): HCPCS | Performed by: SURGERY

## 2022-08-16 PROCEDURE — 25010000002 DEXAMETHASONE PER 1 MG: Performed by: NURSE ANESTHETIST, CERTIFIED REGISTERED

## 2022-08-16 PROCEDURE — 43246 EGD PLACE GASTROSTOMY TUBE: CPT

## 2022-08-16 PROCEDURE — 25010000002 HYDROMORPHONE HCL PF 500 MG/50ML SOLUTION: Performed by: SURGERY

## 2022-08-16 PROCEDURE — 25010000002 CEFAZOLIN IN DEXTROSE 2-4 GM/100ML-% SOLUTION: Performed by: SURGERY

## 2022-08-16 PROCEDURE — 71045 X-RAY EXAM CHEST 1 VIEW: CPT

## 2022-08-16 PROCEDURE — 25010000002 HYDROMORPHONE 1 MG/ML SOLUTION

## 2022-08-16 PROCEDURE — 25010000002 PROPOFOL 10 MG/ML EMULSION: Performed by: NURSE ANESTHETIST, CERTIFIED REGISTERED

## 2022-08-16 PROCEDURE — 25010000002 FENTANYL CITRATE (PF) 50 MCG/ML SOLUTION: Performed by: NURSE ANESTHETIST, CERTIFIED REGISTERED

## 2022-08-16 PROCEDURE — 25010000002 ONDANSETRON PER 1 MG: Performed by: NURSE ANESTHETIST, CERTIFIED REGISTERED

## 2022-08-16 PROCEDURE — 88302 TISSUE EXAM BY PATHOLOGIST: CPT | Performed by: SURGERY

## 2022-08-16 PROCEDURE — 0 LIDOCAINE 1 % SOLUTION: Performed by: NURSE ANESTHETIST, CERTIFIED REGISTERED

## 2022-08-16 DEVICE — LIGACLIP 10-M/L, 10MM ENDOSCOPIC ROTATING MULTIPLE CLIP APPLIERS
Type: IMPLANTABLE DEVICE | Site: ESOPHAGUS | Status: FUNCTIONAL
Brand: LIGACLIP

## 2022-08-16 DEVICE — PHASIX ST MESH, RECTANGLE
Type: IMPLANTABLE DEVICE | Site: ESOPHAGUS | Status: FUNCTIONAL
Brand: PHASIX ST MESH

## 2022-08-16 RX ORDER — FENTANYL CITRATE 50 UG/ML
50 INJECTION, SOLUTION INTRAMUSCULAR; INTRAVENOUS
Status: DISCONTINUED | OUTPATIENT
Start: 2022-08-16 | End: 2022-08-16 | Stop reason: HOSPADM

## 2022-08-16 RX ORDER — HYDRALAZINE HYDROCHLORIDE 20 MG/ML
5 INJECTION INTRAMUSCULAR; INTRAVENOUS
Status: DISCONTINUED | OUTPATIENT
Start: 2022-08-16 | End: 2022-08-16 | Stop reason: HOSPADM

## 2022-08-16 RX ORDER — PROMETHAZINE HYDROCHLORIDE 6.25 MG/5ML
12.5 SYRUP ORAL EVERY 6 HOURS PRN
Status: DISCONTINUED | OUTPATIENT
Start: 2022-08-16 | End: 2022-08-18 | Stop reason: HOSPADM

## 2022-08-16 RX ORDER — FENTANYL CITRATE 50 UG/ML
INJECTION, SOLUTION INTRAMUSCULAR; INTRAVENOUS AS NEEDED
Status: DISCONTINUED | OUTPATIENT
Start: 2022-08-16 | End: 2022-08-16 | Stop reason: SURG

## 2022-08-16 RX ORDER — PROPRANOLOL HYDROCHLORIDE 20 MG/1
80 TABLET ORAL 2 TIMES DAILY
Status: DISCONTINUED | OUTPATIENT
Start: 2022-08-16 | End: 2022-08-18 | Stop reason: HOSPADM

## 2022-08-16 RX ORDER — HYDROMORPHONE HYDROCHLORIDE 1 MG/ML
0.5 INJECTION, SOLUTION INTRAMUSCULAR; INTRAVENOUS; SUBCUTANEOUS
Status: DISCONTINUED | OUTPATIENT
Start: 2022-08-16 | End: 2022-08-16 | Stop reason: HOSPADM

## 2022-08-16 RX ORDER — PANTOPRAZOLE SODIUM 40 MG/1
40 TABLET, DELAYED RELEASE ORAL EVERY MORNING
Status: DISCONTINUED | OUTPATIENT
Start: 2022-08-16 | End: 2022-08-16 | Stop reason: ALTCHOICE

## 2022-08-16 RX ORDER — LIDOCAINE HYDROCHLORIDE 10 MG/ML
INJECTION, SOLUTION INFILTRATION; PERINEURAL AS NEEDED
Status: DISCONTINUED | OUTPATIENT
Start: 2022-08-16 | End: 2022-08-16 | Stop reason: SURG

## 2022-08-16 RX ORDER — LIDOCAINE HYDROCHLORIDE 10 MG/ML
0.5 INJECTION, SOLUTION EPIDURAL; INFILTRATION; INTRACAUDAL; PERINEURAL ONCE AS NEEDED
Status: COMPLETED | OUTPATIENT
Start: 2022-08-16 | End: 2022-08-16

## 2022-08-16 RX ORDER — RAMIPRIL 5 MG/1
5 CAPSULE ORAL DAILY
Status: DISCONTINUED | OUTPATIENT
Start: 2022-08-16 | End: 2022-08-18 | Stop reason: HOSPADM

## 2022-08-16 RX ORDER — IPRATROPIUM BROMIDE AND ALBUTEROL SULFATE 2.5; .5 MG/3ML; MG/3ML
3 SOLUTION RESPIRATORY (INHALATION) ONCE AS NEEDED
Status: DISCONTINUED | OUTPATIENT
Start: 2022-08-16 | End: 2022-08-16 | Stop reason: HOSPADM

## 2022-08-16 RX ORDER — EPHEDRINE SULFATE 50 MG/ML
INJECTION, SOLUTION INTRAVENOUS AS NEEDED
Status: DISCONTINUED | OUTPATIENT
Start: 2022-08-16 | End: 2022-08-16 | Stop reason: SURG

## 2022-08-16 RX ORDER — CEFAZOLIN SODIUM 2 G/100ML
2 INJECTION, SOLUTION INTRAVENOUS ONCE
Status: COMPLETED | OUTPATIENT
Start: 2022-08-16 | End: 2022-08-16

## 2022-08-16 RX ORDER — MAGNESIUM HYDROXIDE 1200 MG/15ML
LIQUID ORAL AS NEEDED
Status: DISCONTINUED | OUTPATIENT
Start: 2022-08-16 | End: 2022-08-16 | Stop reason: HOSPADM

## 2022-08-16 RX ORDER — DROPERIDOL 2.5 MG/ML
0.62 INJECTION, SOLUTION INTRAMUSCULAR; INTRAVENOUS ONCE AS NEEDED
Status: DISCONTINUED | OUTPATIENT
Start: 2022-08-16 | End: 2022-08-16 | Stop reason: HOSPADM

## 2022-08-16 RX ORDER — DEXAMETHASONE SODIUM PHOSPHATE 10 MG/ML
INJECTION INTRAMUSCULAR; INTRAVENOUS AS NEEDED
Status: DISCONTINUED | OUTPATIENT
Start: 2022-08-16 | End: 2022-08-16 | Stop reason: SURG

## 2022-08-16 RX ORDER — FENTANYL CITRATE 50 UG/ML
INJECTION, SOLUTION INTRAMUSCULAR; INTRAVENOUS
Status: COMPLETED
Start: 2022-08-16 | End: 2022-08-16

## 2022-08-16 RX ORDER — ASPIRIN 325 MG
325 TABLET ORAL DAILY
Status: DISCONTINUED | OUTPATIENT
Start: 2022-08-16 | End: 2022-08-18 | Stop reason: HOSPADM

## 2022-08-16 RX ORDER — SODIUM CHLORIDE, SODIUM LACTATE, POTASSIUM CHLORIDE, CALCIUM CHLORIDE 600; 310; 30; 20 MG/100ML; MG/100ML; MG/100ML; MG/100ML
9 INJECTION, SOLUTION INTRAVENOUS CONTINUOUS PRN
Status: DISCONTINUED | OUTPATIENT
Start: 2022-08-16 | End: 2022-08-16 | Stop reason: HOSPADM

## 2022-08-16 RX ORDER — SODIUM CHLORIDE 0.9 % (FLUSH) 0.9 %
3 SYRINGE (ML) INJECTION EVERY 12 HOURS SCHEDULED
Status: DISCONTINUED | OUTPATIENT
Start: 2022-08-16 | End: 2022-08-16 | Stop reason: HOSPADM

## 2022-08-16 RX ORDER — SODIUM CHLORIDE 0.9 % (FLUSH) 0.9 %
10 SYRINGE (ML) INJECTION AS NEEDED
Status: CANCELLED | OUTPATIENT
Start: 2022-08-16

## 2022-08-16 RX ORDER — SIMETHICONE 80 MG
80 TABLET,CHEWABLE ORAL 4 TIMES DAILY PRN
Status: DISCONTINUED | OUTPATIENT
Start: 2022-08-16 | End: 2022-08-18 | Stop reason: HOSPADM

## 2022-08-16 RX ORDER — SODIUM CHLORIDE, SODIUM LACTATE, POTASSIUM CHLORIDE, CALCIUM CHLORIDE 600; 310; 30; 20 MG/100ML; MG/100ML; MG/100ML; MG/100ML
125 INJECTION, SOLUTION INTRAVENOUS CONTINUOUS
Status: DISCONTINUED | OUTPATIENT
Start: 2022-08-16 | End: 2022-08-17

## 2022-08-16 RX ORDER — PROMETHAZINE HYDROCHLORIDE 25 MG/1
25 SUPPOSITORY RECTAL ONCE AS NEEDED
Status: DISCONTINUED | OUTPATIENT
Start: 2022-08-16 | End: 2022-08-16 | Stop reason: HOSPADM

## 2022-08-16 RX ORDER — HYDROCODONE BITARTRATE AND ACETAMINOPHEN 5; 325 MG/1; MG/1
1 TABLET ORAL ONCE AS NEEDED
Status: DISCONTINUED | OUTPATIENT
Start: 2022-08-16 | End: 2022-08-16 | Stop reason: HOSPADM

## 2022-08-16 RX ORDER — ONDANSETRON 4 MG/1
4 TABLET, FILM COATED ORAL EVERY 6 HOURS PRN
Status: DISCONTINUED | OUTPATIENT
Start: 2022-08-16 | End: 2022-08-18 | Stop reason: HOSPADM

## 2022-08-16 RX ORDER — LABETALOL HYDROCHLORIDE 5 MG/ML
INJECTION, SOLUTION INTRAVENOUS AS NEEDED
Status: DISCONTINUED | OUTPATIENT
Start: 2022-08-16 | End: 2022-08-16 | Stop reason: SURG

## 2022-08-16 RX ORDER — PROPOFOL 10 MG/ML
VIAL (ML) INTRAVENOUS AS NEEDED
Status: DISCONTINUED | OUTPATIENT
Start: 2022-08-16 | End: 2022-08-16 | Stop reason: SURG

## 2022-08-16 RX ORDER — PROMETHAZINE HYDROCHLORIDE 25 MG/1
25 TABLET ORAL ONCE AS NEEDED
Status: DISCONTINUED | OUTPATIENT
Start: 2022-08-16 | End: 2022-08-16 | Stop reason: HOSPADM

## 2022-08-16 RX ORDER — FAMOTIDINE 10 MG/ML
20 INJECTION, SOLUTION INTRAVENOUS ONCE
Status: COMPLETED | OUTPATIENT
Start: 2022-08-16 | End: 2022-08-16

## 2022-08-16 RX ORDER — ROCURONIUM BROMIDE 10 MG/ML
INJECTION, SOLUTION INTRAVENOUS AS NEEDED
Status: DISCONTINUED | OUTPATIENT
Start: 2022-08-16 | End: 2022-08-16 | Stop reason: SURG

## 2022-08-16 RX ORDER — CETIRIZINE HYDROCHLORIDE 10 MG/1
10 TABLET ORAL DAILY
Status: DISCONTINUED | OUTPATIENT
Start: 2022-08-16 | End: 2022-08-18 | Stop reason: HOSPADM

## 2022-08-16 RX ORDER — ENALAPRILAT 2.5 MG/2ML
1.25 INJECTION INTRAVENOUS EVERY 6 HOURS PRN
Status: DISCONTINUED | OUTPATIENT
Start: 2022-08-16 | End: 2022-08-18 | Stop reason: HOSPADM

## 2022-08-16 RX ORDER — FAMOTIDINE 20 MG/1
20 TABLET, FILM COATED ORAL
Status: DISCONTINUED | OUTPATIENT
Start: 2022-08-16 | End: 2022-08-16

## 2022-08-16 RX ORDER — MIDAZOLAM HYDROCHLORIDE 1 MG/ML
0.5 INJECTION INTRAMUSCULAR; INTRAVENOUS
Status: DISCONTINUED | OUTPATIENT
Start: 2022-08-16 | End: 2022-08-16 | Stop reason: HOSPADM

## 2022-08-16 RX ORDER — ONDANSETRON 2 MG/ML
4 INJECTION INTRAMUSCULAR; INTRAVENOUS EVERY 6 HOURS PRN
Status: DISCONTINUED | OUTPATIENT
Start: 2022-08-16 | End: 2022-08-18 | Stop reason: HOSPADM

## 2022-08-16 RX ORDER — SODIUM CHLORIDE 0.9 % (FLUSH) 0.9 %
3-10 SYRINGE (ML) INJECTION AS NEEDED
Status: DISCONTINUED | OUTPATIENT
Start: 2022-08-16 | End: 2022-08-16 | Stop reason: HOSPADM

## 2022-08-16 RX ORDER — NITROGLYCERIN 0.4 MG/1
0.4 TABLET SUBLINGUAL
Status: DISCONTINUED | OUTPATIENT
Start: 2022-08-16 | End: 2022-08-18 | Stop reason: HOSPADM

## 2022-08-16 RX ORDER — BUPIVACAINE HYDROCHLORIDE AND EPINEPHRINE 2.5; 5 MG/ML; UG/ML
INJECTION, SOLUTION EPIDURAL; INFILTRATION; INTRACAUDAL; PERINEURAL AS NEEDED
Status: DISCONTINUED | OUTPATIENT
Start: 2022-08-16 | End: 2022-08-16 | Stop reason: HOSPADM

## 2022-08-16 RX ORDER — MEPERIDINE HYDROCHLORIDE 25 MG/ML
12.5 INJECTION INTRAMUSCULAR; INTRAVENOUS; SUBCUTANEOUS
Status: DISCONTINUED | OUTPATIENT
Start: 2022-08-16 | End: 2022-08-16 | Stop reason: HOSPADM

## 2022-08-16 RX ORDER — NALOXONE HCL 0.4 MG/ML
0.4 VIAL (ML) INJECTION AS NEEDED
Status: DISCONTINUED | OUTPATIENT
Start: 2022-08-16 | End: 2022-08-16 | Stop reason: HOSPADM

## 2022-08-16 RX ORDER — ONDANSETRON 2 MG/ML
INJECTION INTRAMUSCULAR; INTRAVENOUS AS NEEDED
Status: DISCONTINUED | OUTPATIENT
Start: 2022-08-16 | End: 2022-08-16 | Stop reason: SURG

## 2022-08-16 RX ORDER — DROPERIDOL 2.5 MG/ML
INJECTION, SOLUTION INTRAMUSCULAR; INTRAVENOUS
Status: COMPLETED
Start: 2022-08-16 | End: 2022-08-16

## 2022-08-16 RX ORDER — DIPHENHYDRAMINE HYDROCHLORIDE 50 MG/ML
25 INJECTION INTRAMUSCULAR; INTRAVENOUS EVERY 6 HOURS PRN
Status: DISCONTINUED | OUTPATIENT
Start: 2022-08-16 | End: 2022-08-18 | Stop reason: HOSPADM

## 2022-08-16 RX ORDER — SODIUM CHLORIDE, SODIUM LACTATE, POTASSIUM CHLORIDE, CALCIUM CHLORIDE 600; 310; 30; 20 MG/100ML; MG/100ML; MG/100ML; MG/100ML
INJECTION, SOLUTION INTRAVENOUS CONTINUOUS PRN
Status: DISCONTINUED | OUTPATIENT
Start: 2022-08-16 | End: 2022-08-16 | Stop reason: SURG

## 2022-08-16 RX ORDER — NALOXONE HCL 0.4 MG/ML
0.1 VIAL (ML) INJECTION
Status: DISCONTINUED | OUTPATIENT
Start: 2022-08-16 | End: 2022-08-18 | Stop reason: HOSPADM

## 2022-08-16 RX ORDER — SODIUM CHLORIDE 0.9 % (FLUSH) 0.9 %
10 SYRINGE (ML) INJECTION EVERY 12 HOURS SCHEDULED
Status: CANCELLED | OUTPATIENT
Start: 2022-08-16

## 2022-08-16 RX ORDER — LABETALOL HYDROCHLORIDE 5 MG/ML
5 INJECTION, SOLUTION INTRAVENOUS
Status: DISCONTINUED | OUTPATIENT
Start: 2022-08-16 | End: 2022-08-16 | Stop reason: HOSPADM

## 2022-08-16 RX ORDER — DOCUSATE SODIUM 50 MG/5 ML
100 LIQUID (ML) ORAL DAILY
Status: DISCONTINUED | OUTPATIENT
Start: 2022-08-16 | End: 2022-08-18 | Stop reason: HOSPADM

## 2022-08-16 RX ORDER — DROPERIDOL 2.5 MG/ML
0.62 INJECTION, SOLUTION INTRAMUSCULAR; INTRAVENOUS
Status: DISCONTINUED | OUTPATIENT
Start: 2022-08-16 | End: 2022-08-16 | Stop reason: HOSPADM

## 2022-08-16 RX ORDER — ONDANSETRON 2 MG/ML
4 INJECTION INTRAMUSCULAR; INTRAVENOUS ONCE AS NEEDED
Status: DISCONTINUED | OUTPATIENT
Start: 2022-08-16 | End: 2022-08-16 | Stop reason: HOSPADM

## 2022-08-16 RX ORDER — PANTOPRAZOLE SODIUM 40 MG/10ML
40 INJECTION, POWDER, LYOPHILIZED, FOR SOLUTION INTRAVENOUS
Status: DISCONTINUED | OUTPATIENT
Start: 2022-08-16 | End: 2022-08-18 | Stop reason: HOSPADM

## 2022-08-16 RX ORDER — HEPARIN SODIUM 5000 [USP'U]/ML
5000 INJECTION, SOLUTION INTRAVENOUS; SUBCUTANEOUS EVERY 8 HOURS SCHEDULED
Status: DISCONTINUED | OUTPATIENT
Start: 2022-08-17 | End: 2022-08-18 | Stop reason: HOSPADM

## 2022-08-16 RX ADMIN — FENTANYL CITRATE 50 MCG: 50 INJECTION, SOLUTION INTRAMUSCULAR; INTRAVENOUS at 08:24

## 2022-08-16 RX ADMIN — LIDOCAINE HYDROCHLORIDE 0.5 ML: 10 INJECTION, SOLUTION EPIDURAL; INFILTRATION; INTRACAUDAL; PERINEURAL at 06:43

## 2022-08-16 RX ADMIN — PROPOFOL 200 MG: 10 INJECTION, EMULSION INTRAVENOUS at 07:44

## 2022-08-16 RX ADMIN — SODIUM CHLORIDE, POTASSIUM CHLORIDE, SODIUM LACTATE AND CALCIUM CHLORIDE 9 ML/HR: 600; 310; 30; 20 INJECTION, SOLUTION INTRAVENOUS at 06:43

## 2022-08-16 RX ADMIN — PANTOPRAZOLE SODIUM 40 MG: 40 INJECTION, POWDER, FOR SOLUTION INTRAVENOUS at 14:36

## 2022-08-16 RX ADMIN — FENTANYL CITRATE 50 MCG: 50 INJECTION, SOLUTION INTRAMUSCULAR; INTRAVENOUS at 11:00

## 2022-08-16 RX ADMIN — FAMOTIDINE 20 MG: 10 INJECTION INTRAVENOUS at 06:49

## 2022-08-16 RX ADMIN — PROPOFOL 25 MCG/KG/MIN: 10 INJECTION, EMULSION INTRAVENOUS at 07:56

## 2022-08-16 RX ADMIN — LABETALOL 20 MG/4 ML (5 MG/ML) INTRAVENOUS SYRINGE 10 MG: at 08:28

## 2022-08-16 RX ADMIN — LIDOCAINE HYDROCHLORIDE 50 MG: 10 INJECTION, SOLUTION INFILTRATION; PERINEURAL at 07:44

## 2022-08-16 RX ADMIN — HYDROMORPHONE HYDROCHLORIDE 0.5 MG: 1 INJECTION, SOLUTION INTRAMUSCULAR; INTRAVENOUS; SUBCUTANEOUS at 10:30

## 2022-08-16 RX ADMIN — DEXAMETHASONE SODIUM PHOSPHATE 4 MG: 10 INJECTION INTRAMUSCULAR; INTRAVENOUS at 07:58

## 2022-08-16 RX ADMIN — ONDANSETRON 4 MG: 2 INJECTION INTRAMUSCULAR; INTRAVENOUS at 09:55

## 2022-08-16 RX ADMIN — DROPERIDOL 0.62 MG: 2.5 INJECTION, SOLUTION INTRAMUSCULAR; INTRAVENOUS at 10:30

## 2022-08-16 RX ADMIN — ROCURONIUM BROMIDE 50 MG: 50 INJECTION, SOLUTION INTRAVENOUS at 07:45

## 2022-08-16 RX ADMIN — FENTANYL CITRATE 100 MCG: 50 INJECTION, SOLUTION INTRAMUSCULAR; INTRAVENOUS at 07:44

## 2022-08-16 RX ADMIN — SODIUM CHLORIDE, POTASSIUM CHLORIDE, SODIUM LACTATE AND CALCIUM CHLORIDE 125 ML/HR: 600; 310; 30; 20 INJECTION, SOLUTION INTRAVENOUS at 12:58

## 2022-08-16 RX ADMIN — SODIUM CHLORIDE, POTASSIUM CHLORIDE, SODIUM LACTATE AND CALCIUM CHLORIDE: 600; 310; 30; 20 INJECTION, SOLUTION INTRAVENOUS at 07:39

## 2022-08-16 RX ADMIN — ROCURONIUM BROMIDE 20 MG: 50 INJECTION, SOLUTION INTRAVENOUS at 08:46

## 2022-08-16 RX ADMIN — EPHEDRINE SULFATE 10 MG: 50 INJECTION INTRAVENOUS at 07:53

## 2022-08-16 RX ADMIN — PROPOFOL 50 MG: 10 INJECTION, EMULSION INTRAVENOUS at 08:08

## 2022-08-16 RX ADMIN — CEFAZOLIN SODIUM 2 G: 2 INJECTION, SOLUTION INTRAVENOUS at 07:42

## 2022-08-16 RX ADMIN — SODIUM CHLORIDE, POTASSIUM CHLORIDE, SODIUM LACTATE AND CALCIUM CHLORIDE: 600; 310; 30; 20 INJECTION, SOLUTION INTRAVENOUS at 10:01

## 2022-08-16 RX ADMIN — SUGAMMADEX 200 MG: 100 INJECTION, SOLUTION INTRAVENOUS at 09:55

## 2022-08-16 RX ADMIN — HYDROMORPHONE HYDROCHLORIDE: 10 INJECTION, SOLUTION INTRAMUSCULAR; INTRAVENOUS; SUBCUTANEOUS at 12:40

## 2022-08-16 RX ADMIN — FENTANYL CITRATE 50 MCG: 50 INJECTION, SOLUTION INTRAMUSCULAR; INTRAVENOUS at 08:16

## 2022-08-16 NOTE — ANESTHESIA PREPROCEDURE EVALUATION
Anesthesia Evaluation     Patient summary reviewed and Nursing notes reviewed   no history of anesthetic complications:  NPO Solid Status: > 8 hours  NPO Liquid Status: > 2 hours           Airway   Mallampati: II  TM distance: >3 FB  Neck ROM: full  Possible difficult intubation  Dental - normal exam     Pulmonary    (+) decreased breath sounds,   Cardiovascular   Exercise tolerance: good (4-7 METS)    ECG reviewed  Rhythm: regular  Rate: normal    (+) hypertension well controlled 2 medications or greater, past MI  >12 months, CAD, CABG >6 Months, hyperlipidemia,       Neuro/Psych  (+) headaches,    GI/Hepatic/Renal/Endo    (+)  GERD well controlled, PUD,      Musculoskeletal     Abdominal   (+) obese,     Abdomen: soft.   Substance History      OB/GYN          Other   arthritis,                    Anesthesia Plan    ASA 3     general     intravenous induction     Anesthetic plan, risks, benefits, and alternatives have been provided, discussed and informed consent has been obtained with: patient.    Plan discussed with CRNA.        CODE STATUS:

## 2022-08-16 NOTE — PLAN OF CARE
Goal Outcome Evaluation:  Plan of Care Reviewed With: patient           Outcome Evaluation: VSS. Room air. Patient arrived to floor and ambulated from stretcher to bed. Room air. PCA pump in use and patient instructed on use. Normal sinus on monitor. G-tube to gravity and emptied 200ml total during shift. Ambulated in [place] 1x and up in chair for part of shift.

## 2022-08-16 NOTE — PROGRESS NOTES
"Patient Name:  Jeremias Orr  YOB: 1955  [id number]    Surgery Post - Operative Note    Date of visit: 8/16/2022    Subjective   Subjective: Feels OK, pain controlled.       Objective     Objective:    /98 (BP Location: Right arm, Patient Position: Sitting)   Pulse 66   Temp 97.4 °F (36.3 °C) (Oral)   Resp 18   Ht 193 cm (75.98\")   Wt 105 kg (230 lb 9.6 oz)   SpO2 92%   BMI 28.08 kg/m²     CV:  Rate  regular and rhythm  regular  L:  Clear  to auscultation bilaterally   ABD:  Soft, appropriately tender. Dressings and PEG clean, dry and intact   EXT:  No cyanosis, clubbing or edema    CXR shows questionable miniscule upper left apical PTX. No right PTX           Assessment/ Plan: Doing well after Lap PEH repair. Continue Pulmonary toilet    Problem List Items Addressed This Visit        Gastrointestinal Abdominal     Hiatal hernia    Relevant Orders    Tissue Pathology Exam           Active Hospital Problems    Diagnosis  POA   • **Paraesophageal hernia [K44.9]  Yes     Priority: Medium   • Hiatal hernia [K44.9]  Yes   • Essential hypertension [I10]  Yes   • Coronary artery disease [I25.10]  Yes      Resolved Hospital Problems   No resolved problems to display.            Isma Pitts MD  8/16/2022  16:44 EDT    "

## 2022-08-16 NOTE — BRIEF OP NOTE
PARAESOPHAGEAL HERNIA REPAIR LAPAROSCOPIC  Progress Note    Jeremias Orr  8/16/2022    Pre-op Diagnosis:   Paraesophageal hernia       Post-Op Diagnosis Codes:  Same    Procedure/CPT® Codes:        Procedure(s):  PARAESOPHAGEAL HERNIA REPAIR LAPAROSCOPIC WITH MESH, TOUPET, ESOPHAGOGASTRODUENOSCOPY WITH INSERTION OF PERCUTANEOUS ENDOSCOPIC GASTROSTOMY TUBE    Surgeon(s):  Isma Pitts MD    Anesthesia: General    Staff:   Circulator: Rebekah Mireles RN  Physician Assistant: Lubna Jerry PA-C  Scrub Person: Isabelle Jennings; Mindy Calderon  Nursing Assistant: Nelson Gravse PCT; Annamaria Hardin PCT         Estimated Blood Loss: minimal    Urine Voided: * No values recorded between 8/16/2022  7:40 AM and 8/16/2022 10:04 AM *    Specimens:                Specimens     ID Source Type Tests Collected By Collected At Frozen?    A Hernia, Sac Tissue · TISSUE PATHOLOGY EXAM   Isma Pitts MD 8/16/22 0804 No    Description: HIATAL HERNIA SAC    This specimen was not marked as sent.                Drains:   Gastrostomy/Enterostomy Percutaneous endoscopic gastrostomy (PEG) 1 20 Fr. RUQ (Active)       Findings:   1.  Type III paraesophageal hernia with intrathoracic stomach completely reduced and repaired with a posterior hiatal cruroplasty and reinforced with Phasix ST mesh  2.  Toupet fundoplication performed around 46 [place] bougie  3.  20 [place] PEG tube placed with the skin at the 4.5 cm level        Complications: None          Isma Pitts MD     Date: 8/16/2022  Time: 10:04 EDT

## 2022-08-16 NOTE — ANESTHESIA POSTPROCEDURE EVALUATION
Patient: Jeremias Orr    Procedure Summary     Date: 08/16/22 Room / Location:  [facility]    Anesthesia Start: 0740 Anesthesia Stop: 1014    Procedure: PARAESOPHAGEAL HERNIA REPAIR LAPAROSCOPIC WITH MESH, TOUPET, ESOPHAGOGASTRODUENOSCOPY WITH INSERTION OF PERCUTANEOUS ENDOSCOPIC GASTROSTOMY TUBE (N/A Abdomen) Diagnosis:     Surgeons: Isma Pitts MD Provider: Nahum Terrell MD    Anesthesia Type: general ASA Status: 3          Anesthesia Type: general    Vitals  Vitals Value Taken Time   /75 08/16/22 1011   Temp     Pulse 76 08/16/22 1013   Resp     SpO2 90 % 08/16/22 1013   Vitals shown include unvalidated device data.        Post Anesthesia Care and Evaluation    Patient location during evaluation: PACU  Patient participation: complete - patient cannot participate  Post-procedure mental status: sedated.  Pain score: 0  Pain management: adequate    Airway patency: patent  Anesthetic complications: No anesthetic complications  PONV Status: none  Cardiovascular status: acceptable and hemodynamically stable  Respiratory status: acceptable, nasal cannula and oral airway  Hydration status: acceptable    Comments: BP: 126/75  HR: 77  SpO2: 93% 4 L NC  Temp:97.2  RR:12  No anesthesia care post op

## 2022-08-16 NOTE — H&P
Pre-Op H&P  Jeremias Orr  [id number]  1955      Chief complaint: Shortness of breath      Subjective:  Patient is a 67 y.o.male presents for scheduled surgery by Dr. Pitts. He anticipates a PARAESOPHAGEAL HERNIA REPAIR LAPAROSCOPIC WITH MESH, TOUPET today. He has had problems with hernia for 20+ years. He has acid reflux, managed with PPI. He has worsening shortness of breath and chest pressure. He has occasional choking and food gets stuck in his esophagus. Esophagram on 6/8/21 shows large sized hiatal hernia.      Review of Systems:  Constitutional-- No fever, chills or sweats. No fatigue.  CV-- No chest pain, palpitation or syncope. +HTN, HLD, CAD  Resp-- No SOB, cough, hemoptysis  Skin--No rashes or lesions      Allergies:   Allergies   Allergen Reactions   • Crestor [Rosuvastatin Calcium]      leg cramps and weakness after a year.    • Lipitor [Atorvastatin] Other (See Comments)     LEG CRAMPS, WEAKNESS   • Livalo [Pitavastatin]       leg cramps and weakness.    • Vytorin [Ezetimibe-Simvastatin]          Home Meds:  Medications Prior to Admission   Medication Sig Dispense Refill Last Dose   • aspirin 325 MG tablet Take 325 mg by mouth Daily.   8/15/2022 at 0800   • cetirizine (zyrTEC) 10 MG tablet Take 10 mg by mouth Daily.   8/16/2022 at Unknown time   • FIBER PO Take 1 capsule by mouth Daily.   8/16/2022 at Unknown time   • lansoprazole (PREVACID) 30 MG capsule Take 1 capsule by mouth Daily. Further refills from pcp or gi 90 capsule 0 8/16/2022 at Unknown time   • naproxen sodium (ALEVE) 220 MG tablet Take 220 mg by mouth Daily.   8/16/2022 at Unknown time   • propranolol (INDERAL) 80 MG tablet Take 80 mg by mouth 2 (Two) Times a Day.   8/16/2022 at 0430   • ramipril (ALTACE) 5 MG capsule Take 1 capsule by mouth Daily. 90 capsule 2 8/16/2022 at Unknown time   • Alirocumab (Praluent) 75 MG/ML solution auto-injector Inject 1 mL under the skin into the appropriate area as directed Every 14 (Fourteen)  "Days. 6 pen 2 8/9/2022   • nitroglycerin (NITROSTAT) 0.4 MG SL tablet Place 1 tablet under the tongue Every 5 (Five) Minutes As Needed for Chest Pain. Take no more than 3 doses in 15 minutes. 25 tablet 11          PMH:   Past Medical History:   Diagnosis Date   • Coronary artery disease 01/04/2017    Inferior myocardial infarction, status post TPA therapy on 01/21/2001 -- data deficit.  Multivessel disease by cardiac catheterization in June 2006.  Coronary artery bypass grafting x5 by Dr. Eliu Madrid, 01/15/2006:  LIMA to the LAD and diagonal, CATERINA from the LIMA as a pedicle graft sequentially to OM1 and the PLB, SVG to the PDA.  Cardiac GXT revealed anterior myocardial perfusion defect, 05/ • COVID-19 06/25/2022   • Diarrhea     ONSET 8/10/22. DR. PITTS AWARE.   • Dyslipidemia 01/04/2017   • Erosive esophagitis 01/04/2017    Erosive/ulcerative esophagitis.    • Gastroesophageal reflux disease 01/04/2017    Residual gastroesophageal reflux disease with mild dysphagia.    • History of tobacco abuse    • Hyperlipidemia    • Hypertension 01/04/2017   • MI (myocardial infarction) (HCC) 2001    \"Big one\" in 2001 requiring surgical intervention; additional MIs per EKG readings   • Migraine headache 01/04/2017   • Schatzki's ring 01/04/2017     PSH:    Past Surgical History:   Procedure Laterality Date   • CARDIAC CATHETERIZATION  2006   • CHOLECYSTECTOMY  09/2008   • CORONARY ARTERY BYPASS GRAFT  2001    5 VESSEL   • CORONARY STENT PLACEMENT  2006   • ESOPHAGEAL DILATATION     • ESOPHAGEAL MOTILITY STUDY N/A 06/08/2021    Procedure: MANOMETRY;  Surgeon: Isma Pitts MD;  Location: [facility] END[facility]COPY;  Service: Gastroenterology;  Laterality: N/A;  Patient tolerated procedure well   • INCISIONAL HERNIA REPAIR     • INGUINAL HERNIA REPAIR     • KNEE SURGERY Right     RECONSTRUCTION   • WRIST RECONSTRUCTION Left        Social History:   Tobacco:   Social History     Tobacco Use   Smoking Status Former Smoker   • " Packs/day: 0.50   • Years: 20.00   • Pack years: 10.00   • Types: Cigarettes   • Quit date:    • Years since quittin.6   Smokeless Tobacco Never Used      Alcohol:     Social History     Substance and Sexual Activity   Alcohol Use No         Physical Exam:/87 (BP Location: Right arm, Patient Position: Lying)   Pulse 70   Temp 97.7 °F (36.5 °C) (Temporal)   Resp 18   SpO2 96%       General Appearance:    Alert, cooperative, no distress, appears stated age   Head:    Normocephalic, without obvious abnormality, atraumatic   Lungs:     Clear to auscultation bilaterally, respirations unlabored    Heart:   Regular rate and rhythm, S1 and S2 normal    Abdomen:    Soft without tenderness   Extremities:   Extremities normal, atraumatic, no cyanosis or edema   Skin:   Skin color, texture, turgor normal, no rashes or lesions   Neurologic:   Grossly intact     Results Review:     LABS:  Lab Results   Component Value Date    WBC 3.76 08/15/2022    HGB 14.0 08/15/2022    HCT 41.7 08/15/2022    MCV 90.7 08/15/2022     08/15/2022    GLUCOSE 97 08/15/2022    BUN 10 08/15/2022    CREATININE 1.05 08/15/2022     08/15/2022    K 4.9 08/15/2022     08/15/2022    CO2 30.0 (H) 08/15/2022    CALCIUM 9.1 08/15/2022    ALBUMIN 4.20 08/15/2022    AST 24 08/15/2022    ALT 23 08/15/2022    BILITOT 0.5 08/15/2022       RADIOLOGY:  21 esophagram:  FINDINGS: Under fluoroscopic observation, the patient ingested thin  barium. The oral phase of deglutition appeared normal. The esophageal  mucosa appeared grossly normal. There is mild smooth luminal narrowing  of the distal esophagus around the level of the gastroesophageal  junction. Luminal narrowing measures approximately 6 mm in diameter, and  may represent stricture, or spasm. Endoscopy may be considered if  clinically relevant. There was mild gastroesophageal reflux to the level  of the midesophagus. Examination of the stomach demonstrated a large  sized  hiatal hernia. The majority of the stomach appears to the  intrathoracic, with organoaxial rotation. There was no delay in gastric  emptying.    IMPRESSION:  1. Mild smooth luminal narrowing of the distal esophagus, which may  represent stricture, or spasm. Please consider endoscopy if clinically  relevant  2. Mild gastroesophageal reflux to the level of the midesophagus  3. Intrathoracic stomach with organoaxial rotation    I reviewed the patient's new clinical results.    Cancer Staging (if applicable)  Cancer Patient: __ yes __no __unknown; If yes, clinical stage T:__ N:__M:__, stage group or __N/A      Impression: Diaphragmatic hernia without obstruction or gangrene       Plan: PARAESOPHAGEAL HERNIA REPAIR LAPAROSCOPIC WITH MESH, ROSANNA Ruead, APRN   8/16/2022   06:59 EDT

## 2022-08-16 NOTE — OP NOTE
OPERATIVE NOTE    Patient Name:  Jeremias Orr  YOB: 1955  [id number]    8/16/2022        PREOPERATIVE DIAGNOSIS: Paraesophageal hernia without obstruction        POSTOPERATIVE DIAGNOSIS: Same         PROCEDURE PERFORMED:    Laparoscopic Paraesophageal Hernia Repair, Toupet fundoplication with mesh, EGD with PEG placement          SURGEON: Isma Pitts MD       ASSISTANT:   Lubna Jerry PA-C.  Lubna eJrry PA-C   was responsible for performing the following activities: Retraction, Closing, Placing Dressing and Held/Positioned Camera and their skilled assistance was necessary for the success of this case.        SPECIMENS: Hernia sac         ANESTHESIA: General.     EBL: Minimal          FINDINGS:   1.  Type III paraesophageal hernia with intrathoracic stomach completely reduced and repaired with a posterior hiatal cruroplasty, and reinforced with a piece of Phasix ST  mesh    2.  Toupet fundoplication performed around 46 [place] bougie    3. 20 Fr PEG at the 4.5 cm level         INDICATIONS:    Jeremias Orr is a very pleasant 67 y.o. male with a history of reflux disease and paraesophageal hernia . After a complete preoperative workup they were deemed an operative candidate. The risks and benefits were discussed at length with the patient and their family and they agreed to proceed.         DESCRIPTION OF PROCEDURE:      After obtaining informed consent, the patient was taken to the operating room and placed in supine position. After appropriate DVT and antibiotic prophylaxis, general anesthesia was induced. The abdomen was prepped and draped in standard sterile fashion.  After infiltrating the skin with local anesthetic a 12 mm skin incision was made approximately 10 cm from xiphoid process along the left costal margin. An optically guided trocar was then advanced through the abdominal wall into the abdominal cavity without difficulty. The abdomen was insufflated with carbon dioxide gas to  a pressure of 15 mmHg. The laparoscope was then advanced through the trocar and the abdominal contents inspected. There was no evidence of bowel bladder or visceral injury with entry of the trocar. At this point after infiltrating the appropriate areas with local anesthetic a standard laparoscopic Nissen fundoplication port placement schema was followed. A liver retractor was used to retract the liver anteriorly.     Using a combination of electrocautery and ultrasonic dissection the greater curvature of the stomach was mobilized up to the left deion. The pars flaccida was then divided superior and inferior to the hepatic branch of the vagus nerve which was spared throughout the procedure. The anterior surface of the right deion was then scored and using meticulous blunt dissection a retroesophageal window to the left side was created. A Penrose was placed through this and used to encircle the GE junction. A circumferential mobilization of the GE junction from the hiatus was performed using a combination of electrocautery ultrasonic and blunt dissection. This dissection was carried up into the mediastinum to the level of the aortic arch. The anterior and posterior vagus nerves were identified and spared throughout the procedure. The left pleural space was identified and spared throughout the procedure.  Due to the large nature of the hernia sac, the right pleural space was slightly entered, with no injury to the lung whatsoever.  After complete mobilization the hernia sac was taken off the anterior surface of the stomach using ultrasonic dissection and passed off as specimen  .     A posterior hiatal cruroplasty was then performed using pledgeted 0 silk sutures. This created a snug closure of the hiatus around the esophagus. A piece of Phasix ST  mesh was then placed posteriorly, and tacked to the diaphragm using silk sutures. The anesthetist advanced a 46 [place] orogastric bougie under direct visualization into the  "stomach. Around this bougie at the level of the GE junction a Toupet fundoplication was then performed using silk sutures. At the completion of the fundoplication, the bougie was removed, as was the Penrose drain, which was discarded.     An endoscope was then advanced through the mouth and the esophagus into the stomach under direct visualization. The GE junction was visualized within the fundoplication. Under maximal insufflation a retroflexed view of the GE junction was appreciated. This demonstrated an excellent \"stack of coins\" appearance to the fundoplication with good apposition of the GE junction around the scope.     At this point an appropriate site for PEG placement was determined by palpation transillumination and the safe track needle technique.  After infiltrating the skin with local anesthetic a 7 mm skin incision was made in this location.  A needle was advanced through the incision into the stomach under direct laparoscopic visualization.  A guidewire was placed through the needle and grasped with the endoscope and brought through the patient's mouth.  Using the Ponsky pull technique, a 20 Haitian PEG tube was brought through the abdominal wall in this location.  The endoscope was then advanced back through the mouth and esophagus into the stomach where the PEG bumper could be seen abutting the anterior gastric wall in standard fashion without evidence of bleeding.  The endoscope was then used to desufflate the stomach and removed from the patient's mouth without difficulty.     The liver retractor was then removed. All trocars were then removed under direct and laparoscopic visualization and the abdomen desufflated. The incisions were then closed using running subcuticular sutures and dressed with dry sterile dressings. The PEG tube was secured to the skin at the 4.5 cm level, and the bumper at the 5 cm level. It was dressed in standard sterile fashion and placed to gravity drainage.      All sponge " and needle counts were correct times two at the completion of the procedure. The patient recovered from anesthesia, was extubated in the operating room and was transported to the PACU in stable condition.    COMPLICATIONS: None             Isma Pitts MD  8/16/2022  10:05 EDT

## 2022-08-16 NOTE — ANESTHESIA PROCEDURE NOTES
Airway  Urgency: elective    Date/Time: 8/16/2022 7:46 AM  Airway not difficult    General Information and Staff    Patient location during procedure: OR  CRNA/CAA: Abimbola Ennis CRNA    Indications and Patient Condition  Indications for airway management: airway protection    Preoxygenated: yes  MILS not maintained throughout  Mask difficulty assessment: 2 - vent by mask + OA or adjuvant +/- NMBA    Final Airway Details  Final airway type: endotracheal airway      Successful airway: ETT  Cuffed: yes   Successful intubation technique: direct laryngoscopy  Facilitating devices/methods: intubating stylet and cricoid pressure  Endotracheal tube insertion site: oral  Blade: Verito  Blade size: 4  ETT size (mm): 7.5  Cormack-Lehane Classification: grade IIa - partial view of glottis  Placement verified by: chest auscultation and capnometry   Measured from: lips  ETT/EBT  to lips (cm): 22  Number of attempts at approach: 1  Assessment: lips, teeth, and gum same as pre-op and atraumatic intubation    Additional Comments  Negative epigastric sounds, Breath sound equal bilaterally with symmetric chest rise and fall

## 2022-08-17 ENCOUNTER — APPOINTMENT (OUTPATIENT)
Dept: GENERAL RADIOLOGY | Facility: HOSPITAL | Age: 67
End: 2022-08-17

## 2022-08-17 LAB
ANION GAP SERPL CALCULATED.3IONS-SCNC: 9 MMOL/L (ref 5–15)
BASOPHILS # BLD AUTO: 0.02 10*3/MM3 (ref 0–0.2)
BASOPHILS NFR BLD AUTO: 0.4 % (ref 0–1.5)
BUN SERPL-MCNC: 14 MG/DL (ref 8–23)
BUN/CREAT SERPL: 15.4 (ref 7–25)
CALCIUM SPEC-SCNC: 8.5 MG/DL (ref 8.6–10.5)
CHLORIDE SERPL-SCNC: 104 MMOL/L (ref 98–107)
CO2 SERPL-SCNC: 26 MMOL/L (ref 22–29)
CREAT SERPL-MCNC: 0.91 MG/DL (ref 0.76–1.27)
CYTO UR: NORMAL
DEPRECATED RDW RBC AUTO: 42.5 FL (ref 37–54)
EGFRCR SERPLBLD CKD-EPI 2021: 91.8 ML/MIN/1.73
EOSINOPHIL # BLD AUTO: 0.01 10*3/MM3 (ref 0–0.4)
EOSINOPHIL NFR BLD AUTO: 0.2 % (ref 0.3–6.2)
ERYTHROCYTE [DISTWIDTH] IN BLOOD BY AUTOMATED COUNT: 13 % (ref 12.3–15.4)
GLUCOSE SERPL-MCNC: 107 MG/DL (ref 65–99)
HCT VFR BLD AUTO: 38 % (ref 37.5–51)
HGB BLD-MCNC: 12.7 G/DL (ref 13–17.7)
IMM GRANULOCYTES # BLD AUTO: 0.04 10*3/MM3 (ref 0–0.05)
IMM GRANULOCYTES NFR BLD AUTO: 0.8 % (ref 0–0.5)
LAB AP CASE REPORT: NORMAL
LAB AP CLINICAL INFORMATION: NORMAL
LYMPHOCYTES # BLD AUTO: 0.83 10*3/MM3 (ref 0.7–3.1)
LYMPHOCYTES NFR BLD AUTO: 16.2 % (ref 19.6–45.3)
MCH RBC QN AUTO: 30.2 PG (ref 26.6–33)
MCHC RBC AUTO-ENTMCNC: 33.4 G/DL (ref 31.5–35.7)
MCV RBC AUTO: 90.3 FL (ref 79–97)
MONOCYTES # BLD AUTO: 0.56 10*3/MM3 (ref 0.1–0.9)
MONOCYTES NFR BLD AUTO: 11 % (ref 5–12)
NEUTROPHILS NFR BLD AUTO: 3.65 10*3/MM3 (ref 1.7–7)
NEUTROPHILS NFR BLD AUTO: 71.4 % (ref 42.7–76)
NRBC BLD AUTO-RTO: 0 /100 WBC (ref 0–0.2)
PATH REPORT.FINAL DX SPEC: NORMAL
PATH REPORT.GROSS SPEC: NORMAL
PLATELET # BLD AUTO: 168 10*3/MM3 (ref 140–450)
PMV BLD AUTO: 10.1 FL (ref 6–12)
POTASSIUM SERPL-SCNC: 4.5 MMOL/L (ref 3.5–5.2)
RBC # BLD AUTO: 4.21 10*6/MM3 (ref 4.14–5.8)
SODIUM SERPL-SCNC: 139 MMOL/L (ref 136–145)
WBC NRBC COR # BLD: 5.11 10*3/MM3 (ref 3.4–10.8)

## 2022-08-17 PROCEDURE — 85025 COMPLETE CBC W/AUTO DIFF WBC: CPT | Performed by: SURGERY

## 2022-08-17 PROCEDURE — 71045 X-RAY EXAM CHEST 1 VIEW: CPT

## 2022-08-17 PROCEDURE — 25010000002 HYDROMORPHONE PER 4 MG: Performed by: SURGERY

## 2022-08-17 PROCEDURE — 74240 X-RAY XM UPR GI TRC 1CNTRST: CPT

## 2022-08-17 PROCEDURE — G0378 HOSPITAL OBSERVATION PER HR: HCPCS

## 2022-08-17 PROCEDURE — 0 DIATRIZOATE MEGLUMINE & SODIUM PER 1 ML

## 2022-08-17 PROCEDURE — 25010000002 HEPARIN (PORCINE) PER 1000 UNITS: Performed by: SURGERY

## 2022-08-17 PROCEDURE — 80048 BASIC METABOLIC PNL TOTAL CA: CPT | Performed by: SURGERY

## 2022-08-17 PROCEDURE — 97161 PT EVAL LOW COMPLEX 20 MIN: CPT

## 2022-08-17 RX ORDER — HYDROMORPHONE HYDROCHLORIDE 1 MG/ML
0.2 INJECTION, SOLUTION INTRAMUSCULAR; INTRAVENOUS; SUBCUTANEOUS
Status: DISCONTINUED | OUTPATIENT
Start: 2022-08-17 | End: 2022-08-18 | Stop reason: HOSPADM

## 2022-08-17 RX ADMIN — HEPARIN SODIUM 5000 UNITS: 5000 INJECTION INTRAVENOUS; SUBCUTANEOUS at 14:18

## 2022-08-17 RX ADMIN — HEPARIN SODIUM 5000 UNITS: 5000 INJECTION INTRAVENOUS; SUBCUTANEOUS at 21:32

## 2022-08-17 RX ADMIN — HYDROMORPHONE HYDROCHLORIDE 0.2 MG: 1 INJECTION, SOLUTION INTRAMUSCULAR; INTRAVENOUS; SUBCUTANEOUS at 12:05

## 2022-08-17 RX ADMIN — SIMETHICONE 80 MG: 80 TABLET, CHEWABLE ORAL at 14:22

## 2022-08-17 RX ADMIN — SODIUM CHLORIDE, POTASSIUM CHLORIDE, SODIUM LACTATE AND CALCIUM CHLORIDE 125 ML/HR: 600; 310; 30; 20 INJECTION, SOLUTION INTRAVENOUS at 02:50

## 2022-08-17 RX ADMIN — HEPARIN SODIUM 5000 UNITS: 5000 INJECTION INTRAVENOUS; SUBCUTANEOUS at 05:47

## 2022-08-17 RX ADMIN — PROPRANOLOL HYDROCHLORIDE 80 MG: 20 TABLET ORAL at 08:36

## 2022-08-17 RX ADMIN — HYDROMORPHONE HYDROCHLORIDE 0.2 MG: 1 INJECTION, SOLUTION INTRAMUSCULAR; INTRAVENOUS; SUBCUTANEOUS at 16:58

## 2022-08-17 RX ADMIN — PROPRANOLOL HYDROCHLORIDE 80 MG: 20 TABLET ORAL at 21:31

## 2022-08-17 RX ADMIN — PANTOPRAZOLE SODIUM 40 MG: 40 INJECTION, POWDER, FOR SOLUTION INTRAVENOUS at 05:47

## 2022-08-17 NOTE — PLAN OF CARE
Problem: Adult Inpatient Plan of Care  Goal: Plan of Care Review  Outcome: Ongoing, Progressing  Goal: Patient-Specific Goal (Individualized)  Outcome: Ongoing, Progressing  Goal: Absence of Hospital-Acquired Illness or Injury  Outcome: Ongoing, Progressing  Intervention: Identify and Manage Fall Risk  Recent Flowsheet Documentation  Taken 8/17/2022 0800 by Yaquelin Noel RN  Safety Promotion/Fall Prevention:   activity supervised   safety round/check completed  Intervention: Prevent Skin Injury  Recent Flowsheet Documentation  Taken 8/17/2022 0800 by Yaquelin Noel RN  Body Position: position changed independently  Skin Protection:   adhesive use limited   tubing/devices free from skin contact  Intervention: Prevent and Manage VTE (Venous Thromboembolism) Risk  Recent Flowsheet Documentation  Taken 8/17/2022 0800 by Yaquelin Noel RN  Activity Management: activity adjusted per tolerance  VTE Prevention/Management:   bilateral   sequential compression devices off  Range of Motion: active ROM (range of motion) encouraged  Intervention: Prevent Infection  Recent Flowsheet Documentation  Taken 8/17/2022 0800 by Yaquelin Noel RN  Infection Prevention:   environmental surveillance performed   rest/sleep promoted   single patient room provided  Goal: Optimal Comfort and Wellbeing  Outcome: Ongoing, Progressing  Intervention: Provide Person-Centered Care  Recent Flowsheet Documentation  Taken 8/17/2022 0800 by Yaquelin Noel RN  Trust Relationship/Rapport:   care explained   questions answered   questions encouraged   thoughts/feelings acknowledged  Goal: Readiness for Transition of Care  Outcome: Ongoing, Progressing     Problem: Hypertension Comorbidity  Goal: Blood Pressure in Desired Range  Outcome: Ongoing, Progressing  Intervention: Maintain Blood Pressure Management  Recent Flowsheet Documentation  Taken 8/17/2022 0800 by Yaquelin Noel RN  Medication Review/Management: medications reviewed   Goal Outcome Evaluation:

## 2022-08-17 NOTE — THERAPY DISCHARGE NOTE
"Patient Name: Jeremias Orr  : 1955    MRN: [id number]                              Today's Date: 2022       Admit Date: 2022    Visit Dx:     ICD-10-CM ICD-9-CM   1. Hiatal hernia  K44.9 553.3     Patient Active Problem List   Diagnosis   • Coronary artery disease   • Essential hypertension   • Hyperlipidemia LDL goal <70   • Migraine headache   • Schatzki's ring   • Gastroesophageal reflux disease   • Erosive esophagitis   • Paraesophageal hernia   • Hiatal hernia     Past Medical History:   Diagnosis Date   • Coronary artery disease 2017    Inferior myocardial infarction, status post TPA therapy on 2001 -- data deficit.  Multivessel disease by cardiac catheterization in 2006.  Coronary artery bypass grafting x5 by Dr. Eliu Madrid, 01/15/2006:  LIMA to the LAD and diagonal, CATERINA from the LIMA as a pedicle graft sequentially to OM1 and the PLB, SVG to the PDA.  Cardiac GXT revealed anterior myocardial perfusion defect,  COVID-19 2022   • Diarrhea     ONSET 8/10/22. DR. SWANSON AWARE.   • Dyslipidemia 2017   • Erosive esophagitis 2017    Erosive/ulcerative esophagitis.    • Gastroesophageal reflux disease 2017    Residual gastroesophageal reflux disease with mild dysphagia.    • History of tobacco abuse    • Hyperlipidemia    • Hypertension 2017   • MI (myocardial infarction) (HCC)     \"Big one\" in  requiring surgical intervention; additional MIs per EKG readings   • Migraine headache 2017   • Schatzki's ring 2017     Past Surgical History:   Procedure Laterality Date   • CARDIAC CATHETERIZATION     • CHOLECYSTECTOMY  2008   • CORONARY ARTERY BYPASS GRAFT      5 VESSEL   • CORONARY STENT PLACEMENT     • ESOPHAGEAL DILATATION     • ESOPHAGEAL MOTILITY STUDY N/A 2021    Procedure: MANOMETRY;  Surgeon: Isma Swanson MD;  Location: [facility] ENDOSCOPY;  Service: Gastroenterology;  Laterality: N/A;  " Patient tolerated procedure well   • INCISIONAL HERNIA REPAIR     • INGUINAL HERNIA REPAIR     • KNEE SURGERY Right     RECONSTRUCTION   • PARAESOPHAGEAL HERNIA REPAIR N/A 8/16/2022    Procedure: PARAESOPHAGEAL HERNIA REPAIR LAPAROSCOPIC WITH MESH, TOUPET, ESOPHAGOGASTRODUENOSCOPY WITH INSERTION OF PERCUTANEOUS ENDOSCOPIC GASTROSTOMY TUBE;  Surgeon: Isma Pitts MD;  Location: [facility];  Service: General;  Laterality: N/A;   • WRIST RECONSTRUCTION Left       General Information     Row Name 08/17/22 0849          Physical Therapy Time and Intention    Document Type evaluation;therapy note (daily note);discharge treatment  -LR     Mode of Treatment physical therapy;individual therapy  -LR     Row Name 08/17/22 0849          General Information    Patient Profile Reviewed yes  -LR     Prior Level of Function independent:;community mobility;all household mobility;gait;transfer;bed mobility;ADL's  -LR     Existing Precautions/Restrictions other (see comments)  PEG precautions  -LR     Barriers to Rehab none identified  -LR     Row Name 08/17/22 0849          Living Environment    People in Home spouse  can assist at all times upon d/c home  -LR     Row Name 08/17/22 0849          Home Main Entrance    Number of Stairs, Main Entrance two  d/c to family member's 1 story home  -LR     Stair Railings, Main Entrance railings on both sides of stairs  -LR     Row Name 08/17/22 0849          Stairs Within Home, Primary    Number of Stairs, Within Home, Primary none  -LR     Row Name 08/17/22 0849          Cognition    Orientation Status (Cognition) oriented x 4  -LR     Row Name 08/17/22 0849          Safety Issues, Functional Mobility    Safety Issues Affecting Function (Mobility) other (see comments)  none  -LR     Impairments Affecting Function (Mobility) endurance/activity tolerance  -LR           User Key  (r) = Recorded By, (t) = Taken By, (c) = Cosigned By    Initials Name Provider Type    LR Samantha Thomas  Jennie, PT Physical Therapist               Mobility     Row Name 08/17/22 0849          Bed Mobility    Comment, (Bed Mobility) Sitting EOB on arrival and at end of eval.  -LR     Row Name 08/17/22 0849          Transfers    Comment, (Transfers) Demonstrated correct hand placement and safe technique with t/f. No LOB/unsteadiness with t/f. Denied dizziness upon standing up.  -LR     Row Name 08/17/22 0849          Bed-Chair Transfer    Bed-Chair [place] (Transfers) not tested  -LR     Row Name 08/17/22 0849          Sit-Stand Transfer    Sit-Stand [place] (Transfers) supervision  -LR     Assistive Device (Sit-Stand Transfers) other (see comments)  no AD  -LR     Row Name 08/17/22 0849          Gait/Stairs (Locomotion)    [place] Level (Gait) supervision  -LR     Assistive Device (Gait) other (see comments)  no AD  -LR     Distance in Feet (Gait) 400  -LR     Deviations/Abnormal Patterns (Gait) bilateral deviations;mckenzie decreased;gait speed decreased;stride length decreased  -LR     Bilateral Gait Deviations heel strike decreased  -LR     [place] Level (Stairs) not tested  -LR     Comment, (Gait/Stairs) Patient ambulated with step through gait pattern at slow pace with no LOB or unsteadiness. Denied SOA, chest pain, or dizziness with mobility. Gait limited by fatigue.  -LR           User Key  (r) = Recorded By, (t) = Taken By, (c) = Cosigned By    Initials Name Provider Type    LR Samantha Thomas, PT Physical Therapist               Obj/Interventions     Row Name 08/17/22 0849          Range of Motion Comprehensive    General Range of Motion bilateral lower extremity ROM WFL;bilateral upper extremity ROM WFL  -LR     Row Name 08/17/22 0849          Strength Comprehensive (MMT)    General Manual Muscle Testing (MMT) Assessment no strength deficits identified  -LR     Comment, General Manual Muscle Testing (MMT) Assessment B UEs 5/5 throughout, B LEs 5/5 throughout  -LR     Row Name  08/17/22 0849          Balance    Balance Assessment sitting static balance;sitting dynamic balance;standing static balance;standing dynamic balance  -LR     Static Sitting Balance independent  -LR     Dynamic Sitting Balance independent  -LR     Position, Sitting Balance unsupported;sitting edge of bed  -LR     Static Standing Balance supervision  -LR     Dynamic Standing Balance supervision  -LR     Position/Device Used, Standing Balance unsupported  -LR     Row Name 08/17/22 0849          Sensory Assessment (Somatosensory)    Sensory Assessment (Somatosensory) sensation intact;other (see comments)  denies numbness/tingling; reports light touch equal and intact upon assessment  -LR           User Key  (r) = Recorded By, (t) = Taken By, (c) = Cosigned By    Initials Name Provider Type    Samantha Ledesma, PT Physical Therapist               Goals/Plan     Row Name 08/17/22 0849          Transfer Goal 1 (PT)    Activity/Assistive Device (Transfer Goal 1, PT) sit-to-stand/stand-to-sit  -LR     [place] Level/Cues Needed (Transfer Goal 1, PT) independent  -LR     Time Frame (Transfer Goal 1, PT) long term goal (LTG);1 day  -LR     Progress/Outcome (Transfer Goal 1, PT) goal met  -LR     Row Name 08/17/22 0849          Gait Training Goal 1 (PT)    Activity/Assistive Device (Gait Training Goal 1, PT) gait (walking locomotion)  -LR     [place] Level (Gait Training Goal 1, PT) supervision required  -LR     Distance (Gait Training Goal 1, PT) 150 feet  -LR     Time Frame (Gait Training Goal 1, PT) long term goal (LTG);1 day  -LR     Progress/Outcome (Gait Training Goal 1, PT) goal met  -LR     Row Name 08/17/22 0849          Therapy Assessment/Plan (PT)    Planned Therapy Interventions (PT) other (see comments)  d/c PT  -LR           User Key  (r) = Recorded By, (t) = Taken By, (c) = Cosigned By    Initials Name Provider Type    Samantha Ledesma, PT Physical Therapist               Clinical  Impression     Row Name 08/17/22 0849          Pain    Pretreatment Pain Rating 0/10 - no pain  -LR     Posttreatment Pain Rating 0/10 - no pain  -LR     Pain Intervention(s) Ambulation/increased activity;Repositioned  -LR     Row Name 08/17/22 0849          Plan of Care Review    Plan of Care Reviewed With patient;spouse  -LR     Progress improving  -LR     Outcome Evaluation Patient ambulated 400 feet with step through gait pattern, supervision, limited by fatigue. Denied SOA with mobility on RA, 93% prior to ambulation, 90% on RA after ambulation. Encouraged frequent ambulation. Educated on log rolling technique for less stress at PEG site with bed mobility. Recommend d/c home with family. No skilled acute PT needs identified at this time. Will d/c PT.  -LR     Row Name 08/17/22 0849          Therapy Assessment/Plan (PT)    Patient/Family Therapy Goals Statement (PT) go home  -LR     Rehab Potential (PT) good, to achieve stated therapy goals  -LR     Criteria for Skilled Interventions Met (PT) no;no problems identified which require skilled intervention;does not meet criteria for skilled intervention  -LR     Therapy Frequency (PT) evaluation only  -LR     Row Name 08/17/22 0849          Vital Signs    Pre SpO2 (%) 94  -LR     O2 Delivery Pre Treatment supplemental O2  -LR     Intra SpO2 (%) 90  -LR     O2 Delivery Intra Treatment room air  -LR     Post SpO2 (%) 93  -LR     O2 Delivery Post Treatment supplemental O2  -LR     Pre Patient Position Sitting  -LR     Intra Patient Position Sitting  -LR     Post Patient Position Sitting  -LR     Row Name 08/17/22 0849          Positioning and Restraints    Pre-Treatment Position in bed  -LR     Post Treatment Position bed  -LR     In Bed notified nsg;call light within reach;encouraged to call for assist;sitting EOB;with family/caregiver;side rails up x2  -LR           User Key  (r) = Recorded By, (t) = Taken By, (c) = Cosigned By    Initials Name Provider Type    LR  Samantha Thomas, PT Physical Therapist               Outcome Measures     Row Name 08/17/22 0849          How much help from another person do you currently need...    Turning from your back to your side while in flat bed without using bedrails? 4  -LR     Moving from lying on back to sitting on the side of a flat bed without bedrails? 4  -LR     Moving to and from a bed to a chair (including a wheelchair)? 4  -LR     Standing up from a chair using your arms (e.g., wheelchair, bedside chair)? 4  -LR     Climbing 3-5 steps with a railing? 3  -LR     To walk in hospital room? 3  -LR     AM-PAC 6 Clicks Score (PT) 22  -LR     Highest level of mobility 7 --> Walked 25 feet or more  -LR     Row Name 08/17/22 0849          Functional Assessment    Outcome Measure Options AM-PAC 6 Clicks Basic Mobility (PT)  -LR           User Key  (r) = Recorded By, (t) = Taken By, (c) = Cosigned By    Initials Name Provider Type    LR Samantha Thomas, PT Physical Therapist              Physical Therapy Education                 Title: PT OT SLP Therapies (Done)     Topic: Physical Therapy (Done)     Point: Mobility training (Done)     Learning Progress Summary           Patient Acceptance, E,WEI WRIGHT DU by LR at 8/17/2022 0849    Comment: Educated on correct log rolling technique, precautions, safety with mobility, benefits of frequent ambulation, correct sit<->stand t/f technique, correct gait mechanics, correct stair training technique.   Significant Other Acceptance, EKYLE VU, DU by LR at 8/17/2022 0849    Comment: Educated on correct log rolling technique, precautions, safety with mobility, benefits of frequent ambulation, correct sit<->stand t/f technique, correct gait mechanics, correct stair training technique.                   Point: Home exercise program (Done)     Learning Progress Summary           Patient Acceptance, E,KYLE, CORNELIUS CARVAJAL by LR at 8/17/2022 0849    Comment: Educated on correct log rolling technique,  precautions, safety with mobility, benefits of frequent ambulation, correct sit<->stand t/f technique, correct gait mechanics, correct stair training technique.   Significant Other Acceptance, E,D, VU,DU by LR at 8/17/2022 0849    Comment: Educated on correct log rolling technique, precautions, safety with mobility, benefits of frequent ambulation, correct sit<->stand t/f technique, correct gait mechanics, correct stair training technique.                   Point: Body mechanics (Done)     Learning Progress Summary           Patient Acceptance, E,D, VU,DU by LR at 8/17/2022 0849    Comment: Educated on correct log rolling technique, precautions, safety with mobility, benefits of frequent ambulation, correct sit<->stand t/f technique, correct gait mechanics, correct stair training technique.   Significant Other Acceptance, E,D, VU,DU by LR at 8/17/2022 0849    Comment: Educated on correct log rolling technique, precautions, safety with mobility, benefits of frequent ambulation, correct sit<->stand t/f technique, correct gait mechanics, correct stair training technique.                   Point: Precautions (Done)     Learning Progress Summary           Patient Acceptance, E,D, VU,DU by LR at 8/17/2022 0849    Comment: Educated on correct log rolling technique, precautions, safety with mobility, benefits of frequent ambulation, correct sit<->stand t/f technique, correct gait mechanics, correct stair training technique.   Significant Other Acceptance, E,D, VU,DU by LR at 8/17/2022 0849    Comment: Educated on correct log rolling technique, precautions, safety with mobility, benefits of frequent ambulation, correct sit<->stand t/f technique, correct gait mechanics, correct stair training technique.                               User Key     Initials Effective Dates Name Provider Type Discipline     06/16/21 -  Samantha Thomas, PT Physical Therapist PT              PT Recommendation and Plan  Planned Therapy  Interventions (PT): other (see comments) (d/c PT)  Plan of Care Reviewed With: patient, spouse  Progress: improving  Outcome Evaluation: Patient ambulated 400 feet with step through gait pattern, supervision, limited by fatigue. Denied SOA with mobility on RA, 93% prior to ambulation, 90% on RA after ambulation. Encouraged frequent ambulation. Educated on log rolling technique for less stress at PEG site with bed mobility. Recommend d/c home with family. No skilled acute PT needs identified at this time. Will d/c PT.     Time Calculation:    PT Charges     Row Name 08/17/22 0849             Time Calculation    Start Time 0849  -LR      PT Received On 08/17/22  -LR      PT Goal Re-Cert Due Date 08/27/22  -LR              Untimed Charges    PT Eval/Re-eval Minutes 46  -LR              Total Minutes    Untimed Charges Total Minutes 46  -LR       Total Minutes 46  -LR            User Key  (r) = Recorded By, (t) = Taken By, (c) = Cosigned By    Initials Name Provider Type    LR Samantha Thomas, PT Physical Therapist              Therapy Charges for Today     Code Description Service Date Service Provider Modifiers Qty    [id number]  PT EVAL LOW COMPLEXITY 4 8/17/2022 Samantha Thomas, PT GP 1          PT G-Codes  Outcome Measure Options: AM-PAC 6 Clicks Basic Mobility (PT)  AM-PAC 6 Clicks Score (PT): 22    PT Discharge Summary  Anticipated Discharge Disposition (PT): home with assist  Reason for Discharge: Maximum functional potential achieved, Independent, At baseline function  Outcomes Achieved: Refer to plan of care for updates on goals achieved  Discharge Destination: Home with assist    Samantha Thomas, NIMO  8/17/2022

## 2022-08-17 NOTE — CONSULTS
"                  Clinical Nutrition     Nutrition Education   Reason for Visit:   Physician Consult       Patient Name: Jeremias Orr  YOB: 1955  MRN: [id number]  Date of Encounter: 08/17/22 10:38 EDT  Admission date: 8/16/2022    Comments:       Applicable Diagnoses     PEH s/p repair fundoplication    Diet/Nutrition Related History:     Pt allows feels some residual \"swelling\", hoping will resolve. Allows will follow diet.      Labs reviewed   Yes    Nutrition Diagnosis   8/17  Problem Food and nutrition knowledge deficit   Related To Diet post fundop.   Signs/Symptoms reported   Status: Pt & spouse have material detailing diet to follow  Goal:     Increase knowledge on diet/nutrition effects on condition/status     Nutrition Intervention     Education provided regarding nutrition therapy for: Diet rationale, Key food habit change and Post Nissen Fundoplication      Education provided regarding food habits/behavior related to:Food choices     RD provided printed material via Diet After Nissen Fundoplication Surgery; Material developed by [facility] and Dr. Isma Pitts       Monitoring/Evaluation:     Pt acknowledged understanding of material covered      Kenia Clemens RD  Time Spent: 25 min      "

## 2022-08-17 NOTE — PROGRESS NOTES
"Patient Name:  Jeremias Orr  YOB: 1955  [id number]    Surgery Progress Note    Date of visit: 8/17/2022    Subjective   Subjective: Feels OK, pain controlled.         Objective     Objective:     /69 (BP Location: Right arm, Patient Position: Lying)   Pulse 65   Temp 98.1 °F (36.7 °C) (Oral)   Resp 18   Ht 193 cm (75.98\")   Wt 105 kg (230 lb 9.6 oz)   SpO2 95%   BMI 28.08 kg/m²     Intake/Output Summary (Last 24 hours) at 8/17/2022 0729  Last data filed at 8/17/2022 0500  Gross per 24 hour   Intake 1151.2 ml   Output 500 ml   Net 651.2 ml       CV:  Rhythm  regular and rate regular   L:  Clear  to auscultation bilaterally   Abd:  Bowel sounds positive , soft, appropriately tender. Dressings and PEG c/d/i  Ext:  No cyanosis, clubbing, edema    Recent labs that are back at this time have been reviewed. CXR shows no PTX this AM           Assessment/ Plan:    Problem List Items Addressed This Visit        Gastrointestinal Abdominal     Hiatal hernia- Doing well after repair. UGI and teaching today. Wean O2. Possibly home later today vs tomorrow.      Relevant Orders    Tissue Pathology Exam           Active Hospital Problems    Diagnosis  POA   • **Paraesophageal hernia [K44.9]  Yes     Priority: Medium   • Hiatal hernia [K44.9]  Yes   • Essential hypertension [I10]  Yes   • Coronary artery disease [I25.10]  Yes      Resolved Hospital Problems   No resolved problems to display.              Isma Pitts MD  8/17/2022  07:29 EDT      "

## 2022-08-17 NOTE — CASE MANAGEMENT/SOCIAL WORK
Discharge Planning Assessment  [facility]     Patient Name: Jeremias Orr  MRN: [id number]  Today's Date: 8/17/2022    Admit Date: 8/16/2022     Discharge Needs Assessment     Row Name 08/17/22 0956       Living Environment    People in Home spouse    Name(s) of People in Home Nina- spouse    Current Living Arrangements home    Primary Care Provided by self    Provides Primary Care For no one    Family Caregiver if Needed spouse    Family Caregiver Names Nina- spouse    Quality of Family Relationships involved;supportive    Able to Return to Prior Arrangements yes       Resource/Environmental Concerns    Resource/Environmental Concerns none    Transportation Concerns none       Transition Planning    Patient/Family Anticipates Transition to home with family    Patient/Family Anticipated Services at Transition     Transportation Anticipated family or friend will provide       Discharge Needs Assessment    Equipment Currently Used at Home none    Concerns to be Addressed discharge planning    Anticipated Changes Related to Illness none    Equipment Needed After Discharge none    Provided Post Acute Provider List? N/A               Discharge Plan     Row Name 08/17/22 0957       Plan    Plan Home    Patient/Family in Agreement with Plan yes    Plan Comments Spoke with spouse. Lives with spouse in [facility]. Independent with ADL's at baseline. Uses no DME at present. Spouse at bedside. Plan is for pt to dc to home with spouse providing transport. Awaiting nutritional consult.    Final Discharge Disposition Code 01 - home or self-care              Continued Care and Services - Admitted Since 8/16/2022    Coordination has not been started for this encounter.       Expected Discharge Date and Time     Expected Discharge Date Expected Discharge Time    Aug 18, 2022          Demographic Summary    No documentation.                Functional Status     Row Name 08/17/22 0955       Functional Status    Usual  Activity Tolerance good       Assessment of Health Literacy    How often do you have someone help you read hospital materials? Occasionally    How often do you have problems learning about your medical condition because of difficulty understanding written information? Occasionally    How often do you have a problem understanding what is told to you about your medical condition? Occasionally    How confident are you filling out medical forms by yourself? Somewhat    Health Literacy Moderate       Functional Status, IADL    Medications independent    Meal Preparation independent    Housekeeping independent    Laundry independent    Shopping independent       Mental Status Summary    Recent Changes in Mental Status/Cognitive Functioning no changes               Psychosocial    No documentation.                Abuse/Neglect    No documentation.                Legal    No documentation.                Substance Abuse    No documentation.                Patient Forms    No documentation.                   Diana Ross RN

## 2022-08-17 NOTE — PLAN OF CARE
Problem: Adult Inpatient Plan of Care  Goal: Plan of Care Review  Recent Flowsheet Documentation  Taken 8/17/2022 0849 by Samantha Thomas, PT  Progress: improving  Plan of Care Reviewed With:   patient   spouse  Outcome Evaluation: Patient ambulated 400 feet with step through gait pattern, supervision, limited by fatigue. Denied SOA with mobility on RA, 93% prior to ambulation, 90% on RA after ambulation. Encouraged frequent ambulation. Educated on log rolling technique for less stress at PEG site with bed mobility. Recommend d/c home with family. No skilled acute PT needs identified at this time. Will d/c PT.   Goal Outcome Evaluation:  Plan of Care Reviewed With: patient, spouse        Progress: improving  Outcome Evaluation: Patient ambulated 400 feet with step through gait pattern, supervision, limited by fatigue. Denied SOA with mobility on RA, 93% prior to ambulation, 90% on RA after ambulation. Encouraged frequent ambulation. Educated on log rolling technique for less stress at PEG site with bed mobility. Recommend d/c home with family. No skilled acute PT needs identified at this time. Will d/c PT.

## 2022-08-18 VITALS
TEMPERATURE: 97.9 F | HEART RATE: 64 BPM | SYSTOLIC BLOOD PRESSURE: 161 MMHG | OXYGEN SATURATION: 95 % | BODY MASS INDEX: 28.08 KG/M2 | WEIGHT: 230.6 LBS | HEIGHT: 76 IN | DIASTOLIC BLOOD PRESSURE: 99 MMHG | RESPIRATION RATE: 18 BRPM

## 2022-08-18 LAB
ANION GAP SERPL CALCULATED.3IONS-SCNC: 9 MMOL/L (ref 5–15)
BUN SERPL-MCNC: 13 MG/DL (ref 8–23)
BUN/CREAT SERPL: 12.9 (ref 7–25)
CALCIUM SPEC-SCNC: 8.3 MG/DL (ref 8.6–10.5)
CHLORIDE SERPL-SCNC: 100 MMOL/L (ref 98–107)
CO2 SERPL-SCNC: 26 MMOL/L (ref 22–29)
CREAT SERPL-MCNC: 1.01 MG/DL (ref 0.76–1.27)
DEPRECATED RDW RBC AUTO: 43.1 FL (ref 37–54)
EGFRCR SERPLBLD CKD-EPI 2021: 81 ML/MIN/1.73
ERYTHROCYTE [DISTWIDTH] IN BLOOD BY AUTOMATED COUNT: 13.1 % (ref 12.3–15.4)
GLUCOSE SERPL-MCNC: 97 MG/DL (ref 65–99)
HCT VFR BLD AUTO: 37.6 % (ref 37.5–51)
HGB BLD-MCNC: 12.7 G/DL (ref 13–17.7)
MCH RBC QN AUTO: 30.4 PG (ref 26.6–33)
MCHC RBC AUTO-ENTMCNC: 33.8 G/DL (ref 31.5–35.7)
MCV RBC AUTO: 90 FL (ref 79–97)
PLATELET # BLD AUTO: 170 10*3/MM3 (ref 140–450)
PMV BLD AUTO: 10.4 FL (ref 6–12)
POTASSIUM SERPL-SCNC: 4 MMOL/L (ref 3.5–5.2)
RBC # BLD AUTO: 4.18 10*6/MM3 (ref 4.14–5.8)
SODIUM SERPL-SCNC: 135 MMOL/L (ref 136–145)
WBC NRBC COR # BLD: 4.84 10*3/MM3 (ref 3.4–10.8)

## 2022-08-18 PROCEDURE — 80048 BASIC METABOLIC PNL TOTAL CA: CPT | Performed by: SURGERY

## 2022-08-18 PROCEDURE — G0378 HOSPITAL OBSERVATION PER HR: HCPCS

## 2022-08-18 PROCEDURE — 85027 COMPLETE CBC AUTOMATED: CPT | Performed by: SURGERY

## 2022-08-18 PROCEDURE — 25010000002 HYDROMORPHONE 1 MG/ML SOLUTION: Performed by: SURGERY

## 2022-08-18 PROCEDURE — 25010000002 HYDROMORPHONE PER 4 MG: Performed by: SURGERY

## 2022-08-18 RX ORDER — DOCUSATE SODIUM 50 MG/5 ML
100 LIQUID (ML) ORAL DAILY
Qty: 200 ML | Refills: 0 | Status: SHIPPED | OUTPATIENT
Start: 2022-08-18

## 2022-08-18 RX ORDER — SIMETHICONE 80 MG
80 TABLET,CHEWABLE ORAL 4 TIMES DAILY PRN
Qty: 60 TABLET | Refills: 1 | Status: SHIPPED | OUTPATIENT
Start: 2022-08-18

## 2022-08-18 RX ORDER — IBUPROFEN 600 MG/1
600 TABLET ORAL EVERY 6 HOURS PRN
Qty: 60 TABLET | Refills: 2 | Status: SHIPPED | OUTPATIENT
Start: 2022-08-18

## 2022-08-18 RX ORDER — ONDANSETRON 4 MG/1
4 TABLET, FILM COATED ORAL EVERY 6 HOURS PRN
Qty: 30 TABLET | Refills: 2 | Status: SHIPPED | OUTPATIENT
Start: 2022-08-18 | End: 2022-08-28

## 2022-08-18 RX ADMIN — PANTOPRAZOLE SODIUM 40 MG: 40 INJECTION, POWDER, FOR SOLUTION INTRAVENOUS at 04:37

## 2022-08-18 RX ADMIN — HYDROCODONE BITARTRATE AND ACETAMINOPHEN 15 ML: 7.5; 325 SOLUTION ORAL at 07:46

## 2022-08-18 RX ADMIN — HYDROMORPHONE HYDROCHLORIDE 0.2 MG: 1 INJECTION, SOLUTION INTRAMUSCULAR; INTRAVENOUS; SUBCUTANEOUS at 04:37

## 2022-08-18 RX ADMIN — HYDROMORPHONE HYDROCHLORIDE 0.2 MG: 1 INJECTION, SOLUTION INTRAMUSCULAR; INTRAVENOUS; SUBCUTANEOUS at 00:07

## 2022-08-18 RX ADMIN — PROPRANOLOL HYDROCHLORIDE 80 MG: 20 TABLET ORAL at 08:05

## 2022-08-18 NOTE — DISCHARGE SUMMARY
Discharge Summary    Patient Name:  Jeremias Orr  YOB: 1955  [id number]      DATE OF ADMISSION: 8/16/2022    DATE OF DISCHARGE: 8/18/2022       FINAL DIAGNOSES:   Patient Active Problem List   Diagnosis   • Coronary artery disease   • Essential hypertension   • Hyperlipidemia LDL goal <70   • Migraine headache   • Schatzki's ring   • Gastroesophageal reflux disease   • Erosive esophagitis   • Paraesophageal hernia   • Hiatal hernia       CONSULTING SERVICES: None      PROCEDURES PERFORMED:   1. Laparoscopic Paraesophageal hernia repair with mesh, Toupet fundoplication, EGD with PEG Placement  on 8/16/2022    HISTORY: The patient is a very pleasant 67 y.o. male with a history of GERD and paraesophageal hernia  . After a complete pre-operative workup he was deemed an operative candidate. The risks and benefits of operation were discussed at length with the patient and their family, and they agree to proceed.      HOSPITAL COURSE:  The patient came in as an outpatient, underwent his operative procedure, and was transferred to the floor.  Postoperatively he did well.  Their pain was initially controlled on IV PCA, and transitioned to oral medications.  A Gastrografin swallow on postoperative day 1 showed an excellent technical result  . They continued to improve, were instructed on appropriate dietary changes, instructed in the care of his PEG , and ready for discharge home on 8/18/2022 .      CONDITION: At the time of discharge, the patient is tolerating a post-fundoplication  diet without difficulty. They are having normal bowel and bladder function, and his incisions are clean, dry and intact. his PEG tube is clean, dry and intact and he has been taught in its care by the nursing staff       DISCHARGE MEDICATIONS:   1. All previous home medications.   2. Hydrocodone  elixir 7.5mg PO Q4 hours as needed for pain  3. Colace elixir 100mg PO BID   4. Zofran 4 mg PO Q6 hours as needed for nausea  5.   Simethicone 80 mg p.o. every 6 hours as needed for bloating  6.  Ibuprofen 600 mg p.o. every 6 hours as needed for migraine headache     DISCHARGE INSTRUCTIONS:  1. The patient is instructed to follow up with Dr. Pitts in  4  weeks’ time.  2. he is instructed to refrain from lifting more than 15 or 20 pounds until their return to clinic.   3. If the patient has worsening problems with fever or chills nausea or vomiting he is to contact Dr. Pitts's office and a contact card has been provided.  4. he is to keep his PEG tube clamped, and flush it twice daily with 60ml of water. If there is nausea or bloating uncontrolled with medications, he is to place the tube to gravity for 30 minutes, then reclamp the tube. They have been taught this by the nursing staff prior to discharge.        Isma Pitts MD  8/18/2022

## 2022-08-18 NOTE — CASE MANAGEMENT/SOCIAL WORK
Case Management Discharge Note      Final Note: Discharged home with no discharge needs identified.    Provided Post Acute Provider List?: N/A    Selected Continued Care - Discharged on 8/18/2022 Admission date: 8/16/2022 - Discharge disposition: Home or Self Care    Destination    No services have been selected for the patient.              Durable Medical Equipment    No services have been selected for the patient.              Dialysis/Infusion    No services have been selected for the patient.              Home Medical Care    No services have been selected for the patient.              Therapy    No services have been selected for the patient.              Community Resources    No services have been selected for the patient.              Community & DME    No services have been selected for the patient.                       Final Discharge Disposition Code: 01 - home or self-care

## 2022-08-18 NOTE — PLAN OF CARE
Problem: Adult Inpatient Plan of Care  Goal: Plan of Care Review  Outcome: Met  Goal: Patient-Specific Goal (Individualized)  Outcome: Met  Goal: Absence of Hospital-Acquired Illness or Injury  Outcome: Met  Goal: Optimal Comfort and Wellbeing  Outcome: Met  Goal: Readiness for Transition of Care  Outcome: Met     Problem: Hypertension Comorbidity  Goal: Blood Pressure in Desired Range  Outcome: Met   Goal Outcome Evaluation:

## 2022-08-18 NOTE — DISCHARGE INSTR - ACTIVITY
Refrain from lifting more than 15 or20 pounds until follow in clinic.  Keep PEG tube clamped.  Flush PEG tube twice daily with 60 cc of tap water. If nausea or bloating place to gravity for 30 min. Then reclamp.

## 2022-08-18 NOTE — DISCHARGE INSTRUCTIONS
DISCHARGE INSTRUCTIONS:  1. The patient is instructed to follow up with Dr. Pitts in  4  weeks’ time.  2. he is instructed to refrain from lifting more than 15 or 20 pounds until their return to clinic.   3. If the patient has worsening problems with fever or chills nausea or vomiting he is to contact Dr. Pitts's office and a contact card has been provided.  4. he is to keep his PEG tube clamped, and flush it twice daily with 60ml of water. If there is nausea or bloating uncontrolled with medications, he is to place the tube to gravity for 30 minutes, then reclamp the tube. They have been taught this by the nursing staff prior to discharge.

## 2022-08-18 NOTE — PROGRESS NOTES
"Patient Name:  Jeremias Orr  YOB: 1955  [id number]    Surgery Progress Note    Date of visit: 8/18/2022    Subjective   Subjective: Feels OK, some dysphagia of thicker liquids, otherwise stable.         Objective     Objective:     /92 (BP Location: Left arm, Patient Position: Lying)   Pulse 81   Temp 97.6 °F (36.4 °C) (Oral)   Resp 18   Ht 193 cm (75.98\")   Wt 105 kg (230 lb 9.6 oz)   SpO2 95%   BMI 28.08 kg/m²     Intake/Output Summary (Last 24 hours) at 8/18/2022 0743  Last data filed at 8/17/2022 1700  Gross per 24 hour   Intake 515 ml   Output --   Net 515 ml       CV:  Rhythm  regular and rate regular   L:  Clear  to auscultation bilaterally   Abd:  Bowel sounds positive , soft, appropriately tender. Dressings and PEg c/d/i  Ext:  No cyanosis, clubbing, edema    Recent labs that are back at this time have been reviewed. UGI shows excellent technical result.           Assessment/ Plan:    Problem List Items Addressed This Visit        Gastrointestinal Abdominal     Hiatal hernia- Doing well after repair. D/C home. RTC with me in 4  weeks. No lifting > 30 lbs until RTC. May remove dressings in 24 hours, may shower at that time.      Relevant Orders    Tissue Pathology Exam (Completed)           Active Hospital Problems    Diagnosis  POA   • **Paraesophageal hernia [K44.9]  Yes     Priority: Medium   • Hiatal hernia [K44.9]  Yes   • Essential hypertension [I10]  Yes   • Coronary artery disease [I25.10]  Yes      Resolved Hospital Problems   No resolved problems to display.              Isma Pitts MD  8/18/2022  07:43 EDT      "

## 2022-08-18 NOTE — NURSING NOTE
Pt and wife instructed on care and flushing of PEG tube at bedside before discharge. Extra supplies given.

## 2022-09-09 ENCOUNTER — TELEPHONE (OUTPATIENT)
Dept: CARDIOLOGY | Facility: CLINIC | Age: 67
End: 2022-09-09

## 2022-09-09 NOTE — TELEPHONE ENCOUNTER
Spoke with pt's wife and she states that he has not had Praluent for at least the last 2 months. He has been getting prepared for his hernia surgery and he is still recouping from that- wife will get him next week and will call me the week of the 19th

## 2022-09-09 NOTE — TELEPHONE ENCOUNTER
----- Message from Magdalene Scott MD sent at 8/31/2022  1:35 PM EDT -----  See if he will try Zetia alone in addition to the Praluent.  If so then repeat lipid profile and liver function test in 3 months.

## 2022-09-22 DIAGNOSIS — E78.5 HYPERLIPIDEMIA LDL GOAL <70: Primary | ICD-10-CM

## 2022-09-22 RX ORDER — ALIROCUMAB 75 MG/ML
75 INJECTION, SOLUTION SUBCUTANEOUS
Qty: 6 ML | Refills: 2 | Status: SHIPPED | OUTPATIENT
Start: 2022-09-22

## 2022-09-22 RX ORDER — EZETIMIBE 10 MG/1
10 TABLET ORAL DAILY
Qty: 90 TABLET | Refills: 1 | Status: SHIPPED | OUTPATIENT
Start: 2022-09-22 | End: 2023-01-16 | Stop reason: SDUPTHER

## 2022-09-22 NOTE — TELEPHONE ENCOUNTER
Pt's wife called back to update us- she said that she misspoke regarding pt not taking praluent- he has in fact been taking Praluent- he will add Zetia 10 mg daily and we will recheck labs in 3 months

## 2022-11-07 RX ORDER — PROPRANOLOL HYDROCHLORIDE 160 MG/1
CAPSULE, EXTENDED RELEASE ORAL
Qty: 90 CAPSULE | Refills: 2 | Status: SHIPPED | OUTPATIENT
Start: 2022-11-07

## 2022-11-07 NOTE — TELEPHONE ENCOUNTER
Confirmed pt is back on 160 mg capsule once daily. He will also go have lipid panel on Zetia and Praluent.

## 2022-12-27 RX ORDER — RAMIPRIL 5 MG/1
CAPSULE ORAL
Qty: 90 CAPSULE | Refills: 2 | Status: SHIPPED | OUTPATIENT
Start: 2022-12-27

## 2022-12-27 NOTE — TELEPHONE ENCOUNTER
Lab Results   Component Value Date    GLUCOSE 97 08/18/2022    CALCIUM 8.3 (L) 08/18/2022     (L) 08/18/2022    K 4.0 08/18/2022    CO2 26.0 08/18/2022     08/18/2022    BUN 13 08/18/2022    CREATININE 1.01 08/18/2022    BCR 12.9 08/18/2022    ANIONGAP 9.0 08/18/2022

## 2023-01-05 ENCOUNTER — HOSPITAL ENCOUNTER (OUTPATIENT)
Age: 68
End: 2023-01-05
Payer: MEDICARE

## 2023-01-05 DIAGNOSIS — E78.5: Primary | ICD-10-CM

## 2023-01-05 LAB
ALBUMIN LEVEL: 3.8 G/DL (ref 3.5–5)
ALBUMIN/GLOB SERPL: 1.5 {RATIO} (ref 1.1–1.8)
ALP ISO SERPL-ACNC: 124 U/L (ref 38–126)
ALT SERPLBLD-CCNC: 25 U/L (ref 12–78)
ANION GAP SERPL CALC-SCNC: 8.8 MEQ/L (ref 5–15)
AST SERPL QL: 30 U/L (ref 17–59)
BILIRUBIN,TOTAL: 0.5 MG/DL (ref 0.2–1.3)
BUN SERPL-MCNC: 22 MG/DL (ref 9–20)
CALCIUM SPEC-MCNC: 8.7 MG/DL (ref 8.4–10.2)
CHLORIDE SPEC-SCNC: 105 MMOL/L (ref 98–107)
CHOLEST SPEC-SCNC: 128 MG/DL (ref 140–200)
CO2 SERPL-SCNC: 30 MMOL/L (ref 22–30)
CREAT BLD-SCNC: 1.1 MG/DL (ref 0.66–1.25)
ESTIMATED GLOMERULAR FILT RATE: 67 ML/MIN (ref 60–?)
GFR (AFRICAN AMERICAN): 81 ML/MIN (ref 60–?)
GLOBULIN SER CALC-MCNC: 2.5 G/DL (ref 1.3–3.2)
GLUCOSE: 97 MG/DL (ref 74–100)
HDLC SERPL-MCNC: 45 MG/DL (ref 40–60)
POTASSIUM: 4.8 MMOL/L (ref 3.5–5.1)
PROT SERPL-MCNC: 6.3 G/DL (ref 6.3–8.2)
SODIUM SPEC-SCNC: 139 MMOL/L (ref 136–145)
TRIGLYCERIDES: 130 MG/DL (ref 30–150)

## 2023-01-05 PROCEDURE — 80053 COMPREHEN METABOLIC PANEL: CPT

## 2023-01-05 PROCEDURE — 80061 LIPID PANEL: CPT

## 2023-01-05 PROCEDURE — 36415 COLL VENOUS BLD VENIPUNCTURE: CPT

## 2023-01-16 RX ORDER — EZETIMIBE 10 MG/1
10 TABLET ORAL DAILY
Qty: 90 TABLET | Refills: 1 | Status: SHIPPED | OUTPATIENT
Start: 2023-01-16

## 2023-04-13 ENCOUNTER — TELEPHONE (OUTPATIENT)
Dept: CARDIOLOGY | Facility: CLINIC | Age: 68
End: 2023-04-13
Payer: MEDICARE

## 2023-04-13 NOTE — TELEPHONE ENCOUNTER
Pt reports BLE (right greater than left) edema- more so when sitting - he reports some bruising in the right lower leg and foot that started prior to this swelling. He will follow up with pcp and rule out any acute issues and then will call me back if he needs anything further.

## 2023-04-14 ENCOUNTER — HOSPITAL ENCOUNTER (OUTPATIENT)
Age: 68
End: 2023-04-14
Payer: MEDICARE

## 2023-04-14 DIAGNOSIS — I10: Primary | ICD-10-CM

## 2023-04-14 DIAGNOSIS — E78.5: ICD-10-CM

## 2023-04-14 DIAGNOSIS — R06.02: ICD-10-CM

## 2023-04-14 DIAGNOSIS — Z12.5: ICD-10-CM

## 2023-04-14 DIAGNOSIS — R60.9: ICD-10-CM

## 2023-04-14 LAB
ALBUMIN LEVEL: 3.7 G/DL (ref 3.5–5)
ALBUMIN/GLOB SERPL: 1.5 {RATIO} (ref 1.1–1.8)
ALP ISO SERPL-ACNC: 134 U/L (ref 38–126)
ALT SERPLBLD-CCNC: 25 U/L (ref 12–78)
ANION GAP SERPL CALC-SCNC: 9.1 MEQ/L (ref 5–15)
AST SERPL QL: 31 U/L (ref 17–59)
BILIRUBIN,TOTAL: 0.5 MG/DL (ref 0.2–1.3)
BUN SERPL-MCNC: 18 MG/DL (ref 9–20)
CALCIUM SPEC-MCNC: 8.2 MG/DL (ref 8.4–10.2)
CHLORIDE SPEC-SCNC: 106 MMOL/L (ref 98–107)
CHOLEST SPEC-SCNC: 121 MG/DL (ref 140–200)
CO2 SERPL-SCNC: 26 MMOL/L (ref 22–30)
CREAT BLD-SCNC: 0.9 MG/DL (ref 0.66–1.25)
ESTIMATED GLOMERULAR FILT RATE: 84 ML/MIN (ref 60–?)
GFR (AFRICAN AMERICAN): 102 ML/MIN (ref 60–?)
GLOBULIN SER CALC-MCNC: 2.4 G/DL (ref 1.3–3.2)
GLUCOSE: 82 MG/DL (ref 74–100)
HCT VFR BLD CALC: 41.6 % (ref 42–52)
HDLC SERPL-MCNC: 37 MG/DL (ref 40–60)
HGB BLD-MCNC: 13.4 G/DL (ref 14.1–18)
MCHC RBC-ENTMCNC: 32.2 G/DL (ref 31.8–35.4)
MCV RBC: 95.7 FL (ref 80–94)
MEAN CORPUSCULAR HEMOGLOBIN: 30.8 PG (ref 27–31.2)
NT PRO BRAIN NATRIURETIC PEP.: 197 PG/ML (ref 0–125)
PLATELET # BLD: 253 K/MM3 (ref 142–424)
POTASSIUM: 4.1 MMOL/L (ref 3.5–5.1)
PROT SERPL-MCNC: 6.1 G/DL (ref 6.3–8.2)
RBC # BLD AUTO: 4.35 M/MM3 (ref 4.6–6.2)
SODIUM SPEC-SCNC: 137 MMOL/L (ref 136–145)
TRIGLYCERIDES: 178 MG/DL (ref 30–150)
WBC # BLD AUTO: 4.5 K/MM3 (ref 4.8–10.8)

## 2023-04-14 PROCEDURE — 85025 COMPLETE CBC W/AUTO DIFF WBC: CPT

## 2023-04-14 PROCEDURE — 83880 ASSAY OF NATRIURETIC PEPTIDE: CPT

## 2023-04-14 PROCEDURE — 80053 COMPREHEN METABOLIC PANEL: CPT

## 2023-04-14 PROCEDURE — G0103 PSA SCREENING: HCPCS

## 2023-04-14 PROCEDURE — 80061 LIPID PANEL: CPT

## 2023-04-28 ENCOUNTER — HOSPITAL ENCOUNTER (OUTPATIENT)
Age: 68
End: 2023-04-28
Payer: MEDICARE

## 2023-04-28 DIAGNOSIS — R60.0: Primary | ICD-10-CM

## 2023-04-28 PROCEDURE — 93306 TTE W/DOPPLER COMPLETE: CPT

## 2023-06-13 ENCOUNTER — TELEPHONE (OUTPATIENT)
Dept: CARDIOLOGY | Facility: CLINIC | Age: 68
End: 2023-06-13
Payer: MEDICARE

## 2023-06-13 DIAGNOSIS — E78.5 HYPERLIPIDEMIA LDL GOAL <70: ICD-10-CM

## 2023-06-13 DIAGNOSIS — I25.810 CORONARY ARTERY DISEASE INVOLVING CORONARY BYPASS GRAFT OF NATIVE HEART WITHOUT ANGINA PECTORIS: Primary | ICD-10-CM

## 2023-06-13 DIAGNOSIS — I10 ESSENTIAL HYPERTENSION: ICD-10-CM

## 2023-06-13 NOTE — TELEPHONE ENCOUNTER
Patient left voice mail message asking if [facility] would like lab work before his appointment next week.  Spoke with patient.  He is instructed that [facility] would like a CMP and lipid panel.  I will put order in for lab work.  Patient states he will come to outpatient lab to have this performed.

## 2023-06-15 ENCOUNTER — LAB (OUTPATIENT)
Dept: LAB | Facility: HOSPITAL | Age: 68
End: 2023-06-15
Payer: MEDICARE

## 2023-06-15 DIAGNOSIS — I25.810 CORONARY ARTERY DISEASE INVOLVING CORONARY BYPASS GRAFT OF NATIVE HEART WITHOUT ANGINA PECTORIS: ICD-10-CM

## 2023-06-15 DIAGNOSIS — E78.5 HYPERLIPIDEMIA LDL GOAL <70: ICD-10-CM

## 2023-06-15 DIAGNOSIS — I10 ESSENTIAL HYPERTENSION: ICD-10-CM

## 2023-06-15 LAB
ALBUMIN SERPL-MCNC: 4 G/DL (ref 3.5–5.2)
ALBUMIN/GLOB SERPL: 1.7 G/DL
ALP SERPL-CCNC: 131 U/L (ref 39–117)
ALT SERPL W P-5'-P-CCNC: 23 U/L (ref 1–41)
ANION GAP SERPL CALCULATED.3IONS-SCNC: 8.9 MMOL/L (ref 5–15)
AST SERPL-CCNC: 24 U/L (ref 1–40)
BILIRUB SERPL-MCNC: 0.3 MG/DL (ref 0–1.2)
BUN SERPL-MCNC: 20 MG/DL (ref 8–23)
BUN/CREAT SERPL: 18.2 (ref 7–25)
CALCIUM SPEC-SCNC: 9 MG/DL (ref 8.6–10.5)
CHLORIDE SERPL-SCNC: 107 MMOL/L (ref 98–107)
CHOLEST SERPL-MCNC: 163 MG/DL (ref 0–200)
CO2 SERPL-SCNC: 26.1 MMOL/L (ref 22–29)
CREAT SERPL-MCNC: 1.1 MG/DL (ref 0.76–1.27)
EGFRCR SERPLBLD CKD-EPI 2021: 72.7 ML/MIN/1.73
GLOBULIN UR ELPH-MCNC: 2.3 GM/DL
GLUCOSE SERPL-MCNC: 100 MG/DL (ref 65–99)
HDLC SERPL-MCNC: 37 MG/DL (ref 40–60)
LDLC SERPL CALC-MCNC: 91 MG/DL (ref 0–100)
LDLC/HDLC SERPL: 2.29 {RATIO}
POTASSIUM SERPL-SCNC: 4.5 MMOL/L (ref 3.5–5.2)
PROT SERPL-MCNC: 6.3 G/DL (ref 6–8.5)
SODIUM SERPL-SCNC: 142 MMOL/L (ref 136–145)
TRIGL SERPL-MCNC: 206 MG/DL (ref 0–150)
VLDLC SERPL-MCNC: 35 MG/DL (ref 5–40)

## 2023-06-15 PROCEDURE — 80061 LIPID PANEL: CPT

## 2023-06-15 PROCEDURE — 80053 COMPREHEN METABOLIC PANEL: CPT

## 2023-06-15 PROCEDURE — 36415 COLL VENOUS BLD VENIPUNCTURE: CPT

## 2023-08-14 DIAGNOSIS — R60.0 BILATERAL LEG EDEMA: Primary | ICD-10-CM

## 2023-08-15 ENCOUNTER — LAB (OUTPATIENT)
Dept: LAB | Facility: HOSPITAL | Age: 68
End: 2023-08-15
Payer: MEDICARE

## 2023-08-15 DIAGNOSIS — R60.0 BILATERAL LEG EDEMA: ICD-10-CM

## 2023-08-15 LAB
ANION GAP SERPL CALCULATED.3IONS-SCNC: 10.9 MMOL/L (ref 5–15)
BUN SERPL-MCNC: 17 MG/DL (ref 8–23)
BUN/CREAT SERPL: 14 (ref 7–25)
CALCIUM SPEC-SCNC: 9.1 MG/DL (ref 8.6–10.5)
CHLORIDE SERPL-SCNC: 104 MMOL/L (ref 98–107)
CO2 SERPL-SCNC: 25.1 MMOL/L (ref 22–29)
CREAT SERPL-MCNC: 1.21 MG/DL (ref 0.76–1.27)
EGFRCR SERPLBLD CKD-EPI 2021: 64.8 ML/MIN/1.73
GLUCOSE SERPL-MCNC: 97 MG/DL (ref 65–99)
POTASSIUM SERPL-SCNC: 4.5 MMOL/L (ref 3.5–5.2)
SODIUM SERPL-SCNC: 140 MMOL/L (ref 136–145)

## 2023-08-15 PROCEDURE — 80048 BASIC METABOLIC PNL TOTAL CA: CPT

## 2023-08-15 PROCEDURE — 36415 COLL VENOUS BLD VENIPUNCTURE: CPT

## 2023-09-02 RX ORDER — RAMIPRIL 5 MG/1
CAPSULE ORAL
Qty: 90 CAPSULE | Refills: 1 | Status: SHIPPED | OUTPATIENT
Start: 2023-09-02

## 2023-09-26 ENCOUNTER — TELEPHONE (OUTPATIENT)
Dept: CARDIOLOGY | Facility: CLINIC | Age: 68
End: 2023-09-26
Payer: MEDICARE

## 2023-09-26 NOTE — TELEPHONE ENCOUNTER
Patient left voice mail message asking about his labs from yesterday and if he needed to change any medication.  Spoke with patient.  He is instructed lab work looks OK and he verbalizes understanding.

## 2024-02-29 RX ORDER — ALIROCUMAB 75 MG/ML
75 INJECTION, SOLUTION SUBCUTANEOUS
Qty: 6 ML | Refills: 1 | Status: SHIPPED | OUTPATIENT
Start: 2024-02-29

## 2024-02-29 NOTE — TELEPHONE ENCOUNTER
Caller: Nina Orr    Relationship: Emergency Contact    Best call back number: [phone]    Requested Prescriptions:   Requested Prescriptions     Pending Prescriptions Disp Refills    Alirocumab (Praluent) 75 MG/ML solution auto-injector 6 mL 2     Sig: Inject 1 mL under the skin into the appropriate area as directed Every 14 (Fourteen) Days.        Pharmacy where request should be sent: [address] [phone] [facility] [phone]      Last office visit with prescribing clinician: 6/20/2023   Last telemedicine visit with prescribing clinician: Visit date not found   Next office visit with prescribing clinician: Visit date not found     Additional details provided by patient:     Does the patient have less than a 3 day supply:  [] Yes  [x] No    Simran Cohn Rep   02/29/24 15:44 EST

## 2024-04-10 RX ORDER — PROPRANOLOL HYDROCHLORIDE 160 MG/1
CAPSULE, EXTENDED RELEASE ORAL
Qty: 90 CAPSULE | Refills: 0 | OUTPATIENT
Start: 2024-04-10

## 2024-05-24 ENCOUNTER — TELEPHONE (OUTPATIENT)
Dept: CARDIOLOGY | Facility: CLINIC | Age: 69
End: 2024-05-24
Payer: MEDICARE

## 2024-05-24 RX ORDER — RAMIPRIL 5 MG/1
5 CAPSULE ORAL DAILY
Qty: 90 CAPSULE | Refills: 0 | Status: SHIPPED | OUTPATIENT
Start: 2024-05-24

## 2024-05-24 NOTE — TELEPHONE ENCOUNTER
Caller: Nina Orr    Relationship: Emergency Contact    Best call back number: [phone]    Requested Prescriptions:   Requested Prescriptions     Pending Prescriptions Disp Refills    ramipril (ALTACE) 5 MG capsule 90 capsule 1     Sig: Take 1 capsule by mouth Daily.        Pharmacy where request should be sent:      Last office visit with prescribing clinician: 6/20/2023   Last telemedicine visit with prescribing clinician: Visit date not found   Next office visit with prescribing clinician: Visit date not found     Additional details provided by patient:     Does the patient have less than a 3 day supply:  [x] Yes  [] No    Would you like a call back once the refill request has been completed: [] Yes [x] No    If the office needs to give you a call back, can they leave a voicemail: [] Yes [x] No    Simran Kline   05/24/24 09:00 EDT

## 2024-06-17 ENCOUNTER — TELEPHONE (OUTPATIENT)
Dept: CARDIOLOGY | Facility: CLINIC | Age: 69
End: 2024-06-17
Payer: MEDICARE

## 2024-06-17 DIAGNOSIS — I25.810 CORONARY ARTERY DISEASE INVOLVING CORONARY BYPASS GRAFT OF NATIVE HEART WITHOUT ANGINA PECTORIS: Primary | ICD-10-CM

## 2024-06-17 DIAGNOSIS — E78.5 HYPERLIPIDEMIA LDL GOAL <70: ICD-10-CM

## 2024-06-17 RX ORDER — PROPRANOLOL HYDROCHLORIDE 160 MG/1
160 CAPSULE, EXTENDED RELEASE ORAL DAILY
Qty: 30 CAPSULE | Refills: 0 | Status: SHIPPED | OUTPATIENT
Start: 2024-06-17

## 2024-06-17 RX ORDER — BUMETANIDE 0.5 MG/1
0.5 TABLET ORAL DAILY
Qty: 30 TABLET | Refills: 0 | Status: SHIPPED | OUTPATIENT
Start: 2024-06-17

## 2024-06-17 NOTE — TELEPHONE ENCOUNTER
Caller: Nina Orr    Relationship: Emergency Contact    Best call back number: [phone]    What was the call regarding: WOULD LIKE TO KNOW IF HE NEEDS ORDER FOR LABS BEFORE NEXT APPT IN AUGUST. PLEASE CALL BACK TO ADVISE, THANK YOU.

## 2024-06-17 NOTE — TELEPHONE ENCOUNTER
Patient will need annual labs - lipid and CMP.    Spoke with patients wife.  She is instructed of above and verbalizes understanding.

## 2024-06-17 NOTE — TELEPHONE ENCOUNTER
Caller: Nina Orr    Relationship: Emergency Contact    Best call back number: [phone]    Requested Prescriptions:   Requested Prescriptions     Pending Prescriptions Disp Refills    bumetanide (BUMEX) 0.5 MG tablet 30 tablet 11     Sig: Take 1 tablet by mouth Daily.    propranolol LA (INDERAL LA) 160 MG 24 hr capsule 90 capsule 2     Sig: Take 1 capsule by mouth Daily.        Pharmacy where request should be sent: [address] [phone] [facility] [phone]     Last office visit with prescribing clinician: 6/20/2023   Last telemedicine visit with prescribing clinician: Visit date not found   Next office visit with prescribing clinician: 8/8/2024     Additional details provided by patient:     Does the patient have less than a 3 day supply:  [x] Yes  [] No        Simran Cohn Rep   06/17/24 10:37 EDT

## 2024-07-22 RX ORDER — BUMETANIDE 0.5 MG/1
0.5 TABLET ORAL DAILY
Qty: 30 TABLET | Refills: 0 | Status: SHIPPED | OUTPATIENT
Start: 2024-07-22

## 2024-08-08 ENCOUNTER — LAB (OUTPATIENT)
Dept: LAB | Facility: HOSPITAL | Age: 69
End: 2024-08-08
Payer: MEDICARE

## 2024-08-08 ENCOUNTER — OFFICE VISIT (OUTPATIENT)
Dept: CARDIOLOGY | Facility: CLINIC | Age: 69
End: 2024-08-08
Payer: MEDICARE

## 2024-08-08 VITALS
WEIGHT: 251.2 LBS | BODY MASS INDEX: 30.59 KG/M2 | HEART RATE: 69 BPM | HEIGHT: 76 IN | DIASTOLIC BLOOD PRESSURE: 76 MMHG | SYSTOLIC BLOOD PRESSURE: 118 MMHG | OXYGEN SATURATION: 97 %

## 2024-08-08 DIAGNOSIS — E78.5 HYPERLIPIDEMIA LDL GOAL <70: ICD-10-CM

## 2024-08-08 DIAGNOSIS — E78.5 DYSLIPIDEMIA: ICD-10-CM

## 2024-08-08 DIAGNOSIS — I25.810 CORONARY ARTERY DISEASE INVOLVING CORONARY BYPASS GRAFT OF NATIVE HEART WITHOUT ANGINA PECTORIS: ICD-10-CM

## 2024-08-08 DIAGNOSIS — I25.810 CORONARY ARTERY DISEASE INVOLVING CORONARY BYPASS GRAFT OF NATIVE HEART WITHOUT ANGINA PECTORIS: Primary | ICD-10-CM

## 2024-08-08 DIAGNOSIS — I10 ESSENTIAL HYPERTENSION: ICD-10-CM

## 2024-08-08 LAB
ALBUMIN SERPL-MCNC: 4.1 G/DL (ref 3.5–5.2)
ALBUMIN/GLOB SERPL: 1.5 G/DL
ALP SERPL-CCNC: 150 U/L (ref 39–117)
ALT SERPL W P-5'-P-CCNC: 31 U/L (ref 1–41)
ANION GAP SERPL CALCULATED.3IONS-SCNC: 6 MMOL/L (ref 5–15)
AST SERPL-CCNC: 28 U/L (ref 1–40)
BILIRUB SERPL-MCNC: 0.3 MG/DL (ref 0–1.2)
BUN SERPL-MCNC: 21 MG/DL (ref 8–23)
BUN/CREAT SERPL: 18.1 (ref 7–25)
CALCIUM SPEC-SCNC: 9.1 MG/DL (ref 8.6–10.5)
CHLORIDE SERPL-SCNC: 108 MMOL/L (ref 98–107)
CHOLEST SERPL-MCNC: 207 MG/DL (ref 0–200)
CO2 SERPL-SCNC: 27 MMOL/L (ref 22–29)
CREAT SERPL-MCNC: 1.16 MG/DL (ref 0.76–1.27)
EGFRCR SERPLBLD CKD-EPI 2021: 67.8 ML/MIN/1.73
GLOBULIN UR ELPH-MCNC: 2.8 GM/DL
GLUCOSE SERPL-MCNC: 105 MG/DL (ref 65–99)
HDLC SERPL-MCNC: 32 MG/DL (ref 40–60)
LDLC SERPL CALC-MCNC: 126 MG/DL (ref 0–100)
LDLC/HDLC SERPL: 3.74 {RATIO}
POTASSIUM SERPL-SCNC: 4.3 MMOL/L (ref 3.5–5.2)
PROT SERPL-MCNC: 6.9 G/DL (ref 6–8.5)
SODIUM SERPL-SCNC: 141 MMOL/L (ref 136–145)
TRIGL SERPL-MCNC: 276 MG/DL (ref 0–150)
VLDLC SERPL-MCNC: 49 MG/DL (ref 5–40)

## 2024-08-08 PROCEDURE — 3074F SYST BP LT 130 MM HG: CPT | Performed by: INTERNAL MEDICINE

## 2024-08-08 PROCEDURE — 36415 COLL VENOUS BLD VENIPUNCTURE: CPT

## 2024-08-08 PROCEDURE — 3078F DIAST BP <80 MM HG: CPT | Performed by: INTERNAL MEDICINE

## 2024-08-08 PROCEDURE — 93000 ELECTROCARDIOGRAM COMPLETE: CPT | Performed by: INTERNAL MEDICINE

## 2024-08-08 PROCEDURE — 1159F MED LIST DOCD IN RCRD: CPT | Performed by: INTERNAL MEDICINE

## 2024-08-08 PROCEDURE — 80053 COMPREHEN METABOLIC PANEL: CPT

## 2024-08-08 PROCEDURE — 1160F RVW MEDS BY RX/DR IN RCRD: CPT | Performed by: INTERNAL MEDICINE

## 2024-08-08 PROCEDURE — 99214 OFFICE O/P EST MOD 30 MIN: CPT | Performed by: INTERNAL MEDICINE

## 2024-08-08 PROCEDURE — 80061 LIPID PANEL: CPT

## 2024-08-08 NOTE — PROGRESS NOTES
[facility] Cardiology    Patient ID: Jeremias Orr is a 69 y.o. male.  : 1955   Contact: [phone]    Encounter date: 2024    PCP: Ted Jackson MD      Chief complaint:   Chief Complaint   Patient presents with    Coronary artery disease involving coronary bypass graft of        Problem List:  Coronary artery disease:  Inferior MI, s/p TPA therapy on 2001 -- data deficit.   Multivessel disease by cardiac catheterization in 2006.   CABG x5, 01/15/2006, Dr. Poli Madrid: LIMA to the LAD and diagonal, CATERINA from the LIMA as a pedicle graft sequentially to OM1 and the PLB, SVG to the PDA.   Cardiac GXT, 2006: Anterior myocardial perfusion defect. Normal EF.  [facility], 2006: S/p PTCA and stent placement to the LCx using a 2.5 x 13 mm Cypher GABINO.  Echo, 2023 ([facility]): EF 55-60%. Diastolic function is indeterminate. Trace MR.   Silent ischemia  MPS, 10/04/2021; EF 80%. When the stress and rest Cardiolite images were compared a small area of fixed inferoposterior hypoperfusion was seen. No evidence of inducible ischemia. Low risk study for future cardiac events. The patient may proceed with surgery at a low risk cardiac risk.   Previous tobacco abuse, cessation 20+ years ago.  Hypertension.   Dyslipidemia.   Migraine headaches.   Erosive/ulcerative esophagitis.  Schatzki's ring.  Residual gastroesophageal reflux disease with mild dysphagia.   Nonspecific generalized joint pain.   Surgical history:    Right knee surgery.   Left wrist reconstruction.   Inguinal hernia repair.   Incisional hernia repair.   Esophageal dilatation.   Coronary artery bypass grafting.   Cholecystectomy, 2008.  Paraesophageal hernia repair    Allergies   Allergen Reactions    Crestor [Rosuvastatin Calcium]      leg cramps and weakness after a year.     Lipitor [Atorvastatin] Other (See Comments)     LEG CRAMPS, WEAKNESS    Livalo [Pitavastatin]       leg cramps and  weakness.     Vytorin [Ezetimibe-Simvastatin]     Zetia [Ezetimibe] Diarrhea       Current Medications:    Current Outpatient Medications:     Alirocumab (Praluent) 75 MG/ML solution auto-injector, Inject 1 mL under the skin into the appropriate area as directed Every 14 (Fourteen) Days., Disp: 6 mL, Rfl: 1    aspirin 325 MG tablet, Take 1 tablet by mouth Daily., Disp: , Rfl:     bumetanide (BUMEX) 0.5 MG tablet, Take 1 tablet by mouth Daily., Disp: 30 tablet, Rfl: 0    cetirizine (zyrTEC) 10 MG tablet, Take 1 tablet by mouth Daily., Disp: , Rfl:     FIBER PO, Take 1 capsule by mouth Daily., Disp: , Rfl:     ibuprofen (ADVIL,MOTRIN) 600 MG tablet, Take 1 tablet by mouth Every 6 (Six) Hours As Needed for Headache., Disp: 60 tablet, Rfl: 2    lansoprazole (PREVACID) 30 MG capsule, Take 1 capsule by mouth Daily. Further refills from pcp or gi, Disp: 90 capsule, Rfl: 0    naproxen sodium (ALEVE) 220 MG tablet, Take 1 tablet by mouth Daily., Disp: , Rfl:     nitroglycerin (NITROSTAT) 0.4 MG SL tablet, Place 1 tablet under the tongue Every 5 (Five) Minutes As Needed for Chest Pain. Take no more than 3 doses in 15 minutes., Disp: 25 tablet, Rfl: 11    propranolol LA (INDERAL LA) 160 MG 24 hr capsule, Take 1 capsule by mouth Daily., Disp: 30 capsule, Rfl: 0    ramipril (ALTACE) 5 MG capsule, Take 1 capsule by mouth Daily., Disp: 90 capsule, Rfl: 0    simethicone (MYLICON) 80 MG chewable tablet, Chew 1 tablet by mouth 4 (Four) Times a Day As Needed for Flatulence (bloating)., Disp: 60 tablet, Rfl: 1    docusate sodium (COLACE) 50 mg/5 mL liquid, Take 10 mL by mouth Daily. (Patient not taking: Reported on 8/8/2024), Disp: 200 mL, Rfl: 0    potassium chloride 10 MEQ CR tablet, 2 tablets As Needed. (Patient not taking: Reported on 6/20/2023), Disp: , Rfl:     HPI    Jeremias Orr is a 69 y.o. male who presents today for a follow up of CAD and cardiac risk factors. Since last visit, patient has been doing well overall from a  "cardiovascular standpoint. He notes the swelling in his legs have improved since starting on Bumex. Patient notes some pain in his left leg while driving and notes it helps to loosen his pants. No leg pain w walking.  He stays busy and active by walking and working. Patient reports that he has not been disciplined with his diet and has gained 6 pounds. He denies chest pain, shortness of breath, orthopnea, palpitations, dizziness, and syncope.        The following portions of the patient's history were reviewed and updated as appropriate: allergies, current medications and problem list.    Pertinent positives as listed in the HPI.  All other systems reviewed are negative.         Vitals:    08/08/24 1320   BP: 118/76   BP Location: Left arm   Patient Position: Sitting   Cuff Size: Adult   Pulse: 69   SpO2: 97%   Weight: 114 kg (251 lb 3.2 oz)   Height: 191.8 cm (75.5\")       Physical Exam:  General: Alert and oriented.  Neck: Jugular venous pressure is within normal limits. Carotids have normal upstrokes without bruits.   Cardiovascular: Heart has a nondisplaced focal PMI. Regular rate and rhythm. No murmur, gallop or rub.  Lungs: Clear, no rales or wheezes. Equal expansion is noted.   Extremities: Trace edema.  Skin: Warm and dry.  Neurologic: Nonfocal.     Diagnostic Data (reviewed with patient):  Lab Results   Component Value Date    GLUCOSE 105 (H) 08/08/2024    BUN 21 08/08/2024    CREATININE 1.16 08/08/2024    BCR 18.1 08/08/2024     08/08/2024    K 4.3 08/08/2024     (H) 08/08/2024    CO2 27.0 08/08/2024    CALCIUM 9.1 08/08/2024    ALBUMIN 4.1 08/08/2024    ALKPHOS 150 (H) 08/08/2024    AST 28 08/08/2024    ALT 31 08/08/2024     Lab Results   Component Value Date    CHOL 207 (H) 08/08/2024    TRIG 276 (H) 08/08/2024    HDL 32 (L) 08/08/2024     (H) 08/08/2024     Advance Care Planning   ACP discussion was held with the patient during this visit. Patient does not have an advance directive, " declines further assistance.           ECG 12 Lead    Date/Time: 2024 1:37 PM  Performed by: Magdalene Scott MD    Authorized by: Magdalene Scott MD  Comparison: compared with previous ECG from 2023  Similar to previous ECG  Comparison to previous ECst degree AV block  Rhythm: sinus rhythm  BPM: 69  Conduction: 1st degree AV block    Clinical impression: normal ECG            Assessment:    ICD-10-CM ICD-9-CM   1. Coronary artery disease involving coronary bypass graft of native heart without angina pectoris  I25.810 414.05   2. Essential hypertension  I10 401.9   3. Dyslipidemia  E78.5 272.4         Plan:  Patient was encouraged to continue to be active and start a healthy diet.  Continue on Praluent 75 mg/mL every 14 days for hyperlipidemia.   Continue on aspirin 325 mg for antiplatelet therapy.   Continue on Bumex 0.5 mg daily for fluid retention.   Continue on nitroglycerin 0.4 mg PRN for chest pain.  Continue on ramipril 5 mg daily for hypertension.   Continue all other current medications.  F/up in 12 months, sooner if needed.      Scribed for Magdalene Scott MD by Jesse Wallace. 2024 13:37 EDT    I Magdalene Scott MD personally performed the services described in this documentation as scribed by the above individual in my presence, and it is both accurate and complete.    Magdalene Scott MD, [facility]

## 2024-08-12 RX ORDER — PROPRANOLOL HYDROCHLORIDE 160 MG/1
160 CAPSULE, EXTENDED RELEASE ORAL DAILY
Qty: 30 CAPSULE | Refills: 0 | Status: SHIPPED | OUTPATIENT
Start: 2024-08-12

## 2024-08-12 RX ORDER — PROPRANOLOL HYDROCHLORIDE 160 MG/1
160 CAPSULE, EXTENDED RELEASE ORAL DAILY
Qty: 30 CAPSULE | Refills: 0 | OUTPATIENT
Start: 2024-08-12

## 2024-08-12 NOTE — TELEPHONE ENCOUNTER
Caller: Nina Orr    Relationship: Emergency Contact    Best call back number: [phone]    Requested Prescriptions:   Requested Prescriptions     Pending Prescriptions Disp Refills    propranolol LA (INDERAL LA) 160 MG 24 hr capsule 30 capsule 0     Sig: Take 1 capsule by mouth Daily.        Pharmacy where request should be sent: [facility] [address] [facility] [phone] [facility] [phone] FX     Last office visit with prescribing clinician: 8/8/2024   Last telemedicine visit with prescribing clinician: Visit date not found   Next office visit with prescribing clinician: 8/14/2025     Additional details provided by patient:     Does the patient have less than a 3 day supply:  [x] Yes  [] No    Would you like a call back once the refill request has been completed: [] Yes [x] No    If the office needs to give you a call back, can they leave a voicemail: [] Yes [x] No    Simran Huffman Rep   08/12/24 09:34 EDT

## 2024-08-14 NOTE — TELEPHONE ENCOUNTER
Caller: Nina Orr    Relationship: Emergency Contact    Best call back number: [phone]    What is the best time to reach you: ANYTIME     Who are you requesting to speak with (clinical staff, provider,  specific staff member): ANYONE     What was the call regarding: PATIENTS WIFE WOULD LIKE A CALL BACK TO DISCUSS WHY  ONLY GOT A 30 DAY SUPPLY INSTEAD OF 90 ON HIS PROPANOLOL 160 MG. STATES THE MEDICATION ALSO HAS NO REFILLS REMAINING.

## 2024-08-21 RX ORDER — BUMETANIDE 0.5 MG/1
0.5 TABLET ORAL DAILY
Qty: 30 TABLET | Refills: 6 | Status: SHIPPED | OUTPATIENT
Start: 2024-08-21

## 2024-08-21 RX ORDER — RAMIPRIL 5 MG/1
5 CAPSULE ORAL DAILY
Qty: 30 CAPSULE | Refills: 6 | Status: SHIPPED | OUTPATIENT
Start: 2024-08-21

## 2024-09-11 ENCOUNTER — HOSPITAL ENCOUNTER (EMERGENCY)
Age: 69
Discharge: HOME | End: 2024-09-11
Payer: MEDICARE

## 2024-09-11 VITALS
DIASTOLIC BLOOD PRESSURE: 80 MMHG | OXYGEN SATURATION: 99 % | RESPIRATION RATE: 20 BRPM | SYSTOLIC BLOOD PRESSURE: 143 MMHG | HEART RATE: 75 BPM | TEMPERATURE: 99.14 F

## 2024-09-11 VITALS — BODY MASS INDEX: 29.9 KG/M2

## 2024-09-11 VITALS
TEMPERATURE: 99 F | RESPIRATION RATE: 20 BRPM | HEART RATE: 75 BPM | SYSTOLIC BLOOD PRESSURE: 143 MMHG | DIASTOLIC BLOOD PRESSURE: 80 MMHG

## 2024-09-11 DIAGNOSIS — J01.90: ICD-10-CM

## 2024-09-11 DIAGNOSIS — U07.1: Primary | ICD-10-CM

## 2024-09-11 DIAGNOSIS — R51.9: ICD-10-CM

## 2024-09-11 PROCEDURE — 99214 OFFICE O/P EST MOD 30 MIN: CPT

## 2024-09-11 PROCEDURE — G0463 HOSPITAL OUTPT CLINIC VISIT: HCPCS

## 2024-09-11 PROCEDURE — 99212 OFFICE O/P EST SF 10 MIN: CPT

## 2024-09-11 PROCEDURE — 87635 SARS-COV-2 COVID-19 AMP PRB: CPT

## 2024-09-11 RX ORDER — PROPRANOLOL HYDROCHLORIDE 160 MG/1
160 CAPSULE, EXTENDED RELEASE ORAL DAILY
Qty: 90 CAPSULE | Refills: 3 | Status: SHIPPED | OUTPATIENT
Start: 2024-09-11

## 2024-09-11 NOTE — EXP.UTC
Discharge Plan
Disposition
Patient Disposition: Home, Self-Care

Condition: Good

Prescriptions
Prescriptions:
New
  amoxicillin 875 mg tablet 
   875 mg PO Q12H Qty: 20 0RF
  benzonatate 100 mg capsule 
   100 mg PO TIDP PRN (Reason: Cough) Qty: 30 0RF
  methylprednisolone 4 mg Tablets,Dose Pack 
   4 mg PO AS DIRECTED 6 Days Qty: 21 0RF
   Rx Instructions:
   Take 1 pack as directed for 6 days

No Action
  ramipril 5 mg capsule 
   5 mg PO BID 
  propranolol 160 mg capsule,extended release 24 hr 
   160 mg PO ONCE 
  aspirin 325 mg tablet 
   325 mg PO BID 
  albuterol sulfate 200 PUFFS HFA aerosol inhaler 
   1 - 2 puffs IH Q6HP PRN (Reason: Wheezing) Qty: 1 0RF
  bumetanide 0.5 mg tablet 
   0.5 mg PO DAILY 
  propranolol [Inderal] 1 mg/mL Solution 
   1 mg PO DAILY 
  Praluent Pen 75 mg/mL pen injector 
   75 mg SQ MONTHLY PRN (Reason: cholesterol) 

Referrals
Follow up/Referrals:
Lester Rios MD [Primary Care Provider] - See instructions

Activity Restrictions/Add. Instructions
Additional Instructions/Restrictions:
Drink plenty of fluids.

Take tylenol or ibuprofen for pain or fever.

Take the medications as directed.

Follow up with your regular doctor.

***GO TO THE ER FOR ANY WORSENING SYMPTOMS***

Clinical Impressions
Clinical Impression:
 Sinusitis, Acute viral syndrome


Instructions
Patient Instructions:  Sinusitis, DI for Sinusitis

Print Language
Print Language: English

Discharge
ED Provider: Lucho Root


[facility] [place] HPI
General
Stated complaint: covid test
Mode of Arrival: Ambulatory
Source of Information: Patient
Time Seen by Provider: 09/11/24 18:38
Description of Symptoms (Recalled from Triage Doc. by RN): wants covid test 
c/o runny nose, headache, sneezing, upset stomach
HEENT Symptoms (Recalled from RN notes): Yes (runny nose)
Resp Symptoms (Recalled from RN notes): Yes
Skin Symptoms (Recalled from RN notes): No
MS Symptoms (Recalled from RN notes): No
Functional Status (Recalled from RN notes): wnl
Related Data
Home Medications

?Medication ?Instructions ?Recorded ?Confirmed
aspirin 325 mg tablet 325 mg PO BID heart 05/23/18 09/11/24
propranolol 160 mg capsule,24 160 mg PO ONCE Hypertension 05/23/18 09/11/24
hr,extended release   
ramipril 5 mg capsule 5 mg PO BID Hypertension 05/23/18 09/11/24
alirocumab 75 mg/mL subcutaneous 75 mg SQ MONTHLY PRN cholesterol 09/11/24 09/11/24
pen injector (Praluent Pen)   
bumetanide 0.5 mg tablet 0.5 mg PO DAILY 09/11/24 09/11/24
propranolol 1 mg/mL intravenous 1 mg PO DAILY 09/11/24 09/11/24
solution   

Previous Rx's

?Medication ?Instructions ?Recorded
albuterol sulfate 90 mcg/actuation 1 - 2 puffs IH Q6HP PRN Wheezing 05/27/20
aerosol inhaler #1 inh 
amoxicillin 875 mg tablet 875 mg PO Q12H #20 tabs 09/11/24
benzonatate 100 mg capsule 100 mg PO TIDP PRN Cough #30 caps 09/11/24
methylprednisolone 4 mg tablets in 4 mg PO AS DIRECTED 6 days #21 tabs 09/11/24
a dose pack  


Allergies

Allergy/AdvReac Type Severity Reaction Status Date / Time
No Known Allergies Allergy   Verified 10/20/18 16:46


Worker's Comp
Is this a Worker's Comp case?: No

[facility]
Disclaimer: 
The information contained in this section may have been updated after the patient was seen, as this information can be updated by other users.

Medical History (Updated 09/11/24 @ 19:23 by LEE ANN Diaz)

Cancer
Migraine
Heart attack
Hyperlipidemia
Hypertension


Surgical History (Updated 09/11/24 @ 18:19 by Reyna De Leon RN)

Hx of cholecystectomy
History of open heart surgery
Hx of right heart catheterization


Social History
Smoking Status:  Former smoker 
alcohol intake:  never 
current occupational status:  employed 
Travel in the last 8 weeks:  None 



ROS Obtained: Yes All systems reviewed & no additional complaints except as documented
Constitutional
Constitutional: Reports poor appetite
Eyes
Eyes: Reports system reviewed and no additional complaints, except as documented
ENT
Ears, Nose, Mouth, and Throat: Reports as per HPI
Cardiovascular
Cardiovascular: Reports system reviewed and no additional complaints, except as documented and Denies chest pain
Respiratory
Respiratory: Denies shortness of breath, Denies chest congestion, Reports cough, Denies stridor and Denies wheezing
Gastrointestinal
Gastrointestingal: Reports system reviewed and no additional complaints, except as documented; Denies abdominal pain, diarrhea or vomiting
Musculoskeletal
Musculoskeletal: Reports system reviewed and no additional complaints, except as documented and Denies arthralgias
Integumentary/Breasts
Skin/Breast: Reports system reviewed and no additional complaints, except as documented and Denies rash
Neurologic
Neurologic: Denies paresthesias
Allergic/Immunologic
Allergic/Immunologic: Denies wheezing

Physical Exam
General
General appearance: alert and in no apparent distress
Eye
Eye exam: Present normal appearance, PERRL and EOMI
ENT
ENT exam: Present mucous membranes moist and normal external ear exam
Expanded ENT Exam
External ear exam: Present normal external inspection
TM/Canal exam: Bilateral TM: erythema and bulging
Nose exam: Absent sinus tenderness
Nasal speculum exam: Bilateral: normal
Mouth exam: Present normal external inspection; Absent drooling
Teeth exam: Present normal inspection
Throat exam: Present tonsillar erythema and tonsillomegaly
Neck
Neck exam: Present normal inspection, full ROM and trachea midline; Absent tenderness, lymphadenopathy or thyromegaly
Chest
Chest inspection: Present normal inspection and symmetric chest wall rise; Absent tenderness or rash
Respiratory
Respiratory exam: Present normal lung sounds bilaterally; Absent respiratory distress, wheezes, stridor or accessory muscle use
Cardiovascular
Cardiovascular exam: Present regular rate, normal rhythm and normal heart sounds
Abdominal Exam
Abdominal exam: Present soft; Absent distention, tenderness, guarding, rebound or rigidity
Extremities Exam
Extremities exam: Present normal inspection, full ROM and normal capillary refill; Absent tenderness or calf tenderness
Back Exam
Back exam: Present normal inspection and full ROM; Absent tenderness
Neurological Exam
Neurological exam: Present alert and oriented X3
Psychiatric
Psychiatric exam: Present normal affect and normal mood
Skin
Skin exam: Present warm, dry, intact and normal color
Lymphatic
Lymphatic Findings: no adenopathy

Medical Decision Making
Medical Records
Medical records reviewed: No I reviewed the patient's medical records.
Thanh Inquiry
Pt receiving controlled substance: No
Vital Signs: 



 09/11/24
18:14
Temperature 99.2 F
Temperature Source Oral
Pulse Rate [Left Brachial] 75
Respiratory Rate 20
Blood Pressure [Left Arm] 143/80 H
Blood Pressure Mean [Left Arm] 101
02 Sat by Pulse Oximetry 99



Lab Data
Lab results reviewed: Yes I reviewed the patient's lab results.

## 2024-09-11 NOTE — ED_ITS
Discharge Plan    
Disposition    
Patient Disposition: Home, Self-Care    
    
Condition: Good    
    
Prescriptions    
Prescriptions:    
New    
  amoxicillin 875 mg tablet     
   875 mg PO Q12H Qty: 20 0RF    
  benzonatate 100 mg capsule     
   100 mg PO TIDP PRN (Reason: Cough) Qty: 30 0RF    
  methylprednisolone 4 mg Tablets,Dose Pack     
   4 mg PO AS DIRECTED 6 Days Qty: 21 0RF    
   Rx Instructions:    
   Take 1 pack as directed for 6 days    
    
No Action    
  ramipril 5 mg capsule     
   5 mg PO BID     
  propranolol 160 mg capsule,extended release 24 hr     
   160 mg PO ONCE     
  aspirin 325 mg tablet     
   325 mg PO BID     
  albuterol sulfate 200 PUFFS HFA aerosol inhaler     
   1 - 2 puffs IH Q6HP PRN (Reason: Wheezing) Qty: 1 0RF    
  bumetanide 0.5 mg tablet     
   0.5 mg PO DAILY     
  propranolol [Inderal] 1 mg/mL Solution     
   1 mg PO DAILY     
  Praluent Pen 75 mg/mL pen injector     
   75 mg SQ MONTHLY PRN (Reason: cholesterol)     
    
Referrals    
Follow up/Referrals:    
Lester Rios MD [Primary Care Provider] - See instructions    
    
Activity Restrictions/Add. Instructions    
Additional Instructions/Restrictions:    
Drink plenty of fluids.    
    
Take tylenol or ibuprofen for pain or fever.    
    
Take the medications as directed.    
    
Follow up with your regular doctor.    
    
***GO TO THE ER FOR ANY WORSENING SYMPTOMS***    
    
Clinical Impressions    
Clinical Impression:    
 Sinusitis, Acute viral syndrome    
    
    
Instructions    
Patient Instructions:  Sinusitis, DI for Sinusitis    
    
Print Language    
Print Language: English    
    
Discharge    
ED Provider: Lucho Root    
    
    
[facility] [place]    
[place]    
Stated complaint: covid test    
Mode of Arrival: Ambulatory    
Source of Information: Patient    
Time Seen by Provider: 09/11/24 18:38    
Description of Symptoms (Recalled from Triage Doc. by RN): wants covid test     
c/o runny nose, headache, sneezing, upset stomach    
HEENT Symptoms (Recalled from RN notes): Yes (runny nose)    
Resp Symptoms (Recalled from RN notes): Yes    
Skin Symptoms (Recalled from RN notes): No    
MS Symptoms (Recalled from RN notes): No    
Functional Status (Recalled from RN notes): wnl    
Related Data    
                                Home Medications    
    
    
    
?Medication ?Instructions ?Recorded ?Confirmed    
     
aspirin 325 mg tablet 325 mg PO BID heart 05/23/18 09/11/24    
     
propranolol 160 mg capsule,24 160 mg PO ONCE Hypertension 05/23/18 09/11/24    
    
hr,extended release       
     
ramipril 5 mg capsule 5 mg PO BID Hypertension 05/23/18 09/11/24    
     
alirocumab 75 mg/mL subcutaneous 75 mg SQ MONTHLY PRN cholesterol 09/11/24 09/11/24    
    
pen injector (Praluent Pen)       
     
bumetanide 0.5 mg tablet 0.5 mg PO DAILY 09/11/24 09/11/24    
     
propranolol 1 mg/mL intravenous 1 mg PO DAILY 09/11/24 09/11/24    
    
solution       
    
    
                                  Previous Rx's    
    
    
    
?Medication ?Instructions ?Recorded    
     
albuterol sulfate 90 mcg/actuation 1 - 2 puffs IH Q6HP PRN Wheezing 05/27/20    
    
aerosol inhaler #1 inh     
     
amoxicillin 875 mg tablet 875 mg PO Q12H #20 tabs 09/11/24    
     
benzonatate 100 mg capsule 100 mg PO TIDP PRN Cough #30 caps 09/11/24    
     
methylprednisolone 4 mg tablets in 4 mg PO AS DIRECTED 6 days #21 tabs 09/11/24    
    
a dose pack      
    
    
    
                                    Allergies    
    
    
    
Allergy/AdvReac Type Severity Reaction Status Date / Time    
     
No Known Allergies Allergy   Verified 10/20/18 16:46    
    
    
    
Worker's Comp    
Is this a Worker's Comp case?: No    
    
[facility]    
Disclaimer:     
The information contained in this section may have been updated after the   
patient was seen, as this information can be updated by other users.    
    
Medical History (Updated 09/11/24 @ 19:23 by LEE ANN Diaz)    
    
Cancer    
Migraine    
Heart attack    
Hyperlipidemia    
Hypertension    
    
    
Surgical History (Updated 09/11/24 @ 18:19 by Reyna De Leon RN)    
    
Hx of cholecystectomy    
History of open heart surgery    
Hx of right heart catheterization    
    
    
Social History    
Smoking Status:  Former smoker     
alcohol intake:  never     
current occupational status:  employed     
Travel in the last 8 weeks:  None     
    
    
    
ROS Obtained: Yes All systems reviewed & no additional complaints except as   
documented    
Constitutional    
Constitutional: Reports poor appetite    
Eyes    
Eyes: Reports system reviewed and no additional complaints, except as documented    
ENT    
Ears, Nose, Mouth, and Throat: Reports as per HPI    
Cardiovascular    
Cardiovascular: Reports system reviewed and no additional complaints, except as   
documented and Denies chest pain    
Respiratory    
Respiratory: Denies shortness of breath, Denies chest congestion, Reports cough,  
Denies stridor and Denies wheezing    
Gastrointestinal    
Gastrointestingal: Reports system reviewed and no additional complaints, except   
as documented; Denies abdominal pain, diarrhea or vomiting    
Musculoskeletal    
Musculoskeletal: Reports system reviewed and no additional complaints, except as  
documented and Denies arthralgias    
Integumentary/Breasts    
Skin/Breast: Reports system reviewed and no additional complaints, except as   
documented and Denies rash    
Neurologic    
Neurologic: Denies paresthesias    
Allergic/Immunologic    
Allergic/Immunologic: Denies wheezing    
    
Physical Exam    
General    
General appearance: alert and in no apparent distress    
Eye    
Eye exam: Present normal appearance, PERRL and EOMI    
ENT    
ENT exam: Present mucous membranes moist and normal external ear exam    
Expanded ENT Exam    
External ear exam: Present normal external inspection    
TM/Canal exam: Bilateral TM: erythema and bulging    
Nose exam: Absent sinus tenderness    
Nasal speculum exam: Bilateral: normal    
Mouth exam: Present normal external inspection; Absent drooling    
Teeth exam: Present normal inspection    
Throat exam: Present tonsillar erythema and tonsillomegaly    
Neck    
Neck exam: Present normal inspection, full ROM and trachea midline; Absent   
tenderness, lymphadenopathy or thyromegaly    
Chest    
Chest inspection: Present normal inspection and symmetric chest wall rise;   
Absent tenderness or rash    
Respiratory    
Respiratory exam: Present normal lung sounds bilaterally; Absent respiratory   
distress, wheezes, stridor or accessory muscle use    
Cardiovascular    
Cardiovascular exam: Present regular rate, normal rhythm and normal heart sounds    
Abdominal Exam    
Abdominal exam: Present soft; Absent distention, tenderness, guarding, rebound   
or rigidity    
Extremities Exam    
Extremities exam: Present normal inspection, full ROM and normal capillary   
refill; Absent tenderness or calf tenderness    
Back Exam    
Back exam: Present normal inspection and full ROM; Absent tenderness    
Neurological Exam    
Neurological exam: Present alert and oriented X3    
Psychiatric    
Psychiatric exam: Present normal affect and normal mood    
Skin    
Skin exam: Present warm, dry, intact and normal color    
Lymphatic    
Lymphatic Findings: no adenopathy    
    
Medical Decision Making    
Medical Records    
Medical records reviewed: No I reviewed the patient's medical records.    
Thanh Inquiry    
Pt receiving controlled substance: No    
Vital Signs:     
    
                                            
    
    
    
 09/11/24    
18:14    
     
Temperature 99.2 F    
     
Temperature Source Oral    
     
Pulse Rate [Left Brachial] 75    
     
Respiratory Rate 20    
     
Blood Pressure [Left Arm] 143/80 H    
     
Blood Pressure Mean [Left Arm] 101    
     
02 Sat by Pulse Oximetry 99    
    
    
    
    
Lab Data    
Lab results reviewed: Yes I reviewed the patient's lab results.

## 2024-11-21 ENCOUNTER — HOSPITAL ENCOUNTER (OUTPATIENT)
Dept: HOSPITAL 22 - RAD | Age: 69
Discharge: HOME | End: 2024-11-21
Payer: MEDICARE

## 2024-11-21 DIAGNOSIS — M54.6: ICD-10-CM

## 2024-11-21 DIAGNOSIS — R07.9: Primary | ICD-10-CM

## 2024-11-21 PROCEDURE — 71046 X-RAY EXAM CHEST 2 VIEWS: CPT

## 2025-02-17 RX ORDER — PROPRANOLOL HYDROCHLORIDE 160 MG/1
160 CAPSULE, EXTENDED RELEASE ORAL DAILY
Qty: 90 CAPSULE | Refills: 0 | Status: SHIPPED | OUTPATIENT
Start: 2025-02-17

## 2025-02-25 RX ORDER — BUMETANIDE 0.5 MG/1
0.5 TABLET ORAL DAILY
Qty: 90 TABLET | Refills: 3 | Status: SHIPPED | OUTPATIENT
Start: 2025-02-25

## 2025-02-25 RX ORDER — RAMIPRIL 5 MG/1
5 CAPSULE ORAL DAILY
Qty: 90 CAPSULE | Refills: 3 | Status: SHIPPED | OUTPATIENT
Start: 2025-02-25

## 2025-02-25 NOTE — TELEPHONE ENCOUNTER
Lab Results   Component Value Date    GLUCOSE 105 (H) 08/08/2024    BUN 21 08/08/2024    CREATININE 1.16 08/08/2024     08/08/2024    K 4.3 08/08/2024     (H) 08/08/2024    CALCIUM 9.1 08/08/2024    PROTEINTOT 6.9 08/08/2024    ALBUMIN 4.1 08/08/2024    ALT 31 08/08/2024    AST 28 08/08/2024    ALKPHOS 150 (H) 08/08/2024    BILITOT 0.3 08/08/2024    GLOB 2.8 08/08/2024    AGRATIO 1.5 08/08/2024    BCR 18.1 08/08/2024    ANIONGAP 6.0 08/08/2024    EGFR 67.8 08/08/2024

## 2025-03-20 RX ORDER — RAMIPRIL 5 MG/1
5 CAPSULE ORAL DAILY
Qty: 90 CAPSULE | Refills: 3 | Status: SHIPPED | OUTPATIENT
Start: 2025-03-20

## 2025-03-21 ENCOUNTER — TELEPHONE (OUTPATIENT)
Dept: CARDIOLOGY | Facility: CLINIC | Age: 70
End: 2025-03-21
Payer: MEDICARE

## 2025-03-21 NOTE — TELEPHONE ENCOUNTER
Voice mail message from patients wife.  She reports [facility].  She states they have not used [facility] in over a year.  All prescriptions need to go through [facility].  Spoke with patient.  He is instructed that I sent a prescription to [facility] by mistake.  He is asked if he needs any refills at this time.  He states he does not.  But I will take [facility] off of his chart.  He verbalizes understanding.

## 2025-03-28 RX ORDER — ALIROCUMAB 75 MG/ML
75 INJECTION, SOLUTION SUBCUTANEOUS
Qty: 6 ML | Refills: 1 | Status: SHIPPED | OUTPATIENT
Start: 2025-03-28

## 2025-03-28 NOTE — TELEPHONE ENCOUNTER
Lab Results   Component Value Date    CHOL 207 (H) 08/08/2024    TRIG 276 (H) 08/08/2024    HDL 32 (L) 08/08/2024     (H) 08/08/2024     Lab Results   Component Value Date    GLUCOSE 105 (H) 08/08/2024    BUN 21 08/08/2024    CREATININE 1.16 08/08/2024     08/08/2024    K 4.3 08/08/2024     (H) 08/08/2024    CALCIUM 9.1 08/08/2024    PROTEINTOT 6.9 08/08/2024    ALBUMIN 4.1 08/08/2024    ALT 31 08/08/2024    AST 28 08/08/2024    ALKPHOS 150 (H) 08/08/2024    BILITOT 0.3 08/08/2024    GLOB 2.8 08/08/2024    AGRATIO 1.5 08/08/2024    BCR 18.1 08/08/2024    ANIONGAP 6.0 08/08/2024    EGFR 67.8 08/08/2024

## 2025-04-01 ENCOUNTER — TELEPHONE (OUTPATIENT)
Dept: CARDIOLOGY | Facility: CLINIC | Age: 70
End: 2025-04-01
Payer: MEDICARE

## 2025-04-01 NOTE — TELEPHONE ENCOUNTER
Patients Praluent no longer covered on insurance formulary.  He has been on praluent 75mg since 2016.  Last office note and lab results given to PW to review.

## 2025-04-03 ENCOUNTER — TELEPHONE (OUTPATIENT)
Dept: CARDIOLOGY | Facility: CLINIC | Age: 70
End: 2025-04-03
Payer: MEDICARE

## 2025-04-03 DIAGNOSIS — E78.5 DYSLIPIDEMIA: ICD-10-CM

## 2025-04-03 DIAGNOSIS — I25.810 CORONARY ARTERY DISEASE INVOLVING CORONARY BYPASS GRAFT OF NATIVE HEART WITHOUT ANGINA PECTORIS: Primary | ICD-10-CM

## 2025-04-03 NOTE — TELEPHONE ENCOUNTER
Patient has been on praluent 75mg Q14 days since 2016.  It is no longer on formulary with his insurance.  Per [facility] - switch patient to repatha 140mg Q14 days and repeat lipid and liver in three months.  Spoke with patient.  He is instructed of above and verbalizes understanding.

## 2025-06-30 ENCOUNTER — TELEPHONE (OUTPATIENT)
Dept: CARDIOLOGY | Facility: CLINIC | Age: 70
End: 2025-06-30

## 2025-06-30 NOTE — TELEPHONE ENCOUNTER
Patient left voice mail message.  He states he was switched from praluent to repatha.  He is having problems with repatha and requests a return call.  Spoke with patient.  He states when he takes the repatha every time he takes it he has burning at the sight, stomach cramps, aches & pains and congestion.  I will discuss with [facility] and call him back.  He verbalizes understanding.

## 2025-07-01 NOTE — TELEPHONE ENCOUNTER
Discussed with [facility] - patient to switch back to praluent.  I will send to pharmacy and do a prior authorization.  Repatha added to allergies.  Spoke with patient.  He is instructed that medication has been sent to pharmacy.  We will have to do a prior authorization and appeal.  When I know something, I will let him know.  He verbalizes understanding.

## (undated) DEVICE — "MH-443 SUCTION VALVE F/EVIS140 EVIS160": Brand: SUCTION VALVE

## (undated) DEVICE — PATIENT RETURN ELECTRODE, SINGLE-USE, CONTACT QUALITY MONITORING, ADULT, WITH 9FT CORD, FOR PATIENTS WEIGING OVER 33LBS. (15KG): Brand: MEGADYNE

## (undated) DEVICE — THE BITE BLOCK MAXI, LATEX FREE STRAP IS USED TO PROTECT THE ENDOSCOPE INSERTION TUBE FROM BEING BITTEN BY THE PATIENT.

## (undated) DEVICE — LAPAROSCOPIC SMOKE FILTRATION SYSTEM: Brand: PALL LAPAROSHIELD® PLUS LAPAROSCOPIC SMOKE FILTRATION SYSTEM

## (undated) DEVICE — MARYLAND JAW LAPAROSCOPIC SEALER/DIVIDER COATED: Brand: LIGASURE

## (undated) DEVICE — SUT SILK 2/0 SH 30IN K833H

## (undated) DEVICE — GOWN,PREVENTION PLUS,XXLARGE,STERILE: Brand: MEDLINE

## (undated) DEVICE — JELLY,LUBE,STERILE,FLIP TOP,TUBE,2-OZ: Brand: MEDLINE

## (undated) DEVICE — APPL CHLORAPREP TINTED 26ML TEAL

## (undated) DEVICE — [HIGH FLOW INSUFFLATOR,  DO NOT USE IF PACKAGE IS DAMAGED,  KEEP DRY,  KEEP AWAY FROM SUNLIGHT,  PROTECT FROM HEAT AND RADIOACTIVE SOURCES.]: Brand: PNEUMOSURE

## (undated) DEVICE — ENDOPATH XCEL UNIVERSAL TROCAR STABLILITY SLEEVES: Brand: ENDOPATH XCEL

## (undated) DEVICE — ENDOCUT SCISSOR TIP, DISPOSABLE: Brand: RENEW

## (undated) DEVICE — PDS II VLT 0 107CM AG ST3: Brand: ENDOLOOP

## (undated) DEVICE — PK LAP LASR CHOLE 10

## (undated) DEVICE — KT WARM LAP LIQUIDSCOPE/HELPOR W/WARMOR/CLTH 2/TROC/SWAB

## (undated) DEVICE — GLV SURG SENSICARE PI MIC PF SZ7 LF STRL

## (undated) DEVICE — ENDOPATH XCEL BLADELESS TROCARS WITH STABILITY SLEEVES: Brand: ENDOPATH XCEL

## (undated) DEVICE — INCISIONLINE PLEDGET SFT 22X1 2.75MM

## (undated) DEVICE — SUT SILK 0 SH 30IN K834H

## (undated) DEVICE — ANTIBACTERIAL UNDYED BRAIDED (POLYGLACTIN 910), SYNTHETIC ABSORBABLE SUTURE: Brand: COATED VICRYL

## (undated) DEVICE — INTENDED USE FOR SURGICAL MARKING ON INTACT SKIN, ALSO PROVIDES A PERMANENT METHOD OF IDENTIFYING OBJECTS IN THE OPERATING ROOM: Brand: WRITESITE® REGULAR TIP SKIN MARKER

## (undated) DEVICE — PK SOL VISC ESOPH 80ML

## (undated) DEVICE — NDL HYPO ECLPS SFTY 25G 1 1/2IN

## (undated) DEVICE — PENROSE DRAIN 18 X .5" SILICONE: Brand: MEDLINE

## (undated) DEVICE — GLV SURG SENSICARE PI MIC PF SZ7.5 LF STRL

## (undated) DEVICE — SYS DRAIN ENTRL FARRELL ENFIT 250ML

## (undated) DEVICE — PERCUTANEOUS ENDOSCOPIC GASTROSTOMY KIT: Brand: ENDOVIVE STANDARD PEG KIT